# Patient Record
Sex: MALE | Race: WHITE | Employment: FULL TIME | URBAN - NONMETROPOLITAN AREA
[De-identification: names, ages, dates, MRNs, and addresses within clinical notes are randomized per-mention and may not be internally consistent; named-entity substitution may affect disease eponyms.]

---

## 2017-11-09 ENCOUNTER — HOSPITAL ENCOUNTER (OUTPATIENT)
Dept: GENERAL RADIOLOGY | Age: 47
Discharge: HOME | End: 2017-11-09
Payer: COMMERCIAL

## 2017-11-09 ENCOUNTER — HOSPITAL ENCOUNTER (OUTPATIENT)
Dept: PREADMISSION TESTING | Age: 47
Discharge: HOME | End: 2017-11-09
Payer: COMMERCIAL

## 2017-11-09 VITALS
BODY MASS INDEX: 32.98 KG/M2 | OXYGEN SATURATION: 98 % | TEMPERATURE: 97.2 F | HEIGHT: 69 IN | WEIGHT: 222.66 LBS | SYSTOLIC BLOOD PRESSURE: 156 MMHG | HEART RATE: 75 BPM | DIASTOLIC BLOOD PRESSURE: 93 MMHG

## 2017-11-09 DIAGNOSIS — Z01.818 PRE-OP TESTING: ICD-10-CM

## 2017-11-09 LAB
ALBUMIN SERPL-MCNC: 4.9 G/DL (ref 3.5–5.1)
ANION GAP SERPL CALCULATED.3IONS-SCNC: 14 MEQ/L (ref 8–16)
BASOPHILS # BLD: 0.4 %
BASOPHILS ABSOLUTE: 0 THOU/MM3 (ref 0–0.1)
BUN BLDV-MCNC: 22 MG/DL (ref 7–22)
CALCIUM SERPL-MCNC: 9.4 MG/DL (ref 8.5–10.5)
CHLORIDE BLD-SCNC: 100 MEQ/L (ref 98–111)
CO2: 27 MEQ/L (ref 23–33)
CREAT SERPL-MCNC: 1 MG/DL (ref 0.4–1.2)
EKG ATRIAL RATE: 72 BPM
EKG P AXIS: 59 DEGREES
EKG P-R INTERVAL: 142 MS
EKG Q-T INTERVAL: 378 MS
EKG QRS DURATION: 86 MS
EKG QTC CALCULATION (BAZETT): 413 MS
EKG R AXIS: 35 DEGREES
EKG T AXIS: 42 DEGREES
EKG VENTRICULAR RATE: 72 BPM
EOSINOPHIL # BLD: 0.9 %
EOSINOPHILS ABSOLUTE: 0 THOU/MM3 (ref 0–0.4)
GFR SERPL CREATININE-BSD FRML MDRD: 80 ML/MIN/1.73M2
GLUCOSE BLD-MCNC: 81 MG/DL (ref 70–108)
HCT VFR BLD CALC: 46 % (ref 42–52)
HEMOGLOBIN: 15.8 GM/DL (ref 14–18)
LYMPHOCYTES # BLD: 27.4 %
LYMPHOCYTES ABSOLUTE: 1.3 THOU/MM3 (ref 1–4.8)
MCH RBC QN AUTO: 32.1 PG (ref 27–31)
MCHC RBC AUTO-ENTMCNC: 34.4 GM/DL (ref 33–37)
MCV RBC AUTO: 93.3 FL (ref 80–94)
MONOCYTES # BLD: 10.4 %
MONOCYTES ABSOLUTE: 0.5 THOU/MM3 (ref 0.4–1.3)
NUCLEATED RED BLOOD CELLS: 0 /100 WBC
PDW BLD-RTO: 12.8 % (ref 11.5–14.5)
PLATELET # BLD: 182 THOU/MM3 (ref 130–400)
PMV BLD AUTO: 7.7 MCM (ref 7.4–10.4)
POTASSIUM SERPL-SCNC: 4.1 MEQ/L (ref 3.5–5.2)
PREALBUMIN: 39.3 MG/DL (ref 20–40)
RBC # BLD: 4.93 MILL/MM3 (ref 4.7–6.1)
SEG NEUTROPHILS: 60.9 %
SEGMENTED NEUTROPHILS ABSOLUTE COUNT: 2.9 THOU/MM3 (ref 1.8–7.7)
SODIUM BLD-SCNC: 141 MEQ/L (ref 135–145)
WBC # BLD: 4.7 THOU/MM3 (ref 4.8–10.8)

## 2017-11-09 PROCEDURE — 80048 BASIC METABOLIC PNL TOTAL CA: CPT

## 2017-11-09 PROCEDURE — 36415 COLL VENOUS BLD VENIPUNCTURE: CPT

## 2017-11-09 PROCEDURE — 71020 XR CHEST STANDARD TWO VW: CPT

## 2017-11-09 PROCEDURE — 87081 CULTURE SCREEN ONLY: CPT

## 2017-11-09 PROCEDURE — 82040 ASSAY OF SERUM ALBUMIN: CPT

## 2017-11-09 PROCEDURE — 85025 COMPLETE CBC W/AUTO DIFF WBC: CPT

## 2017-11-09 PROCEDURE — 84134 ASSAY OF PREALBUMIN: CPT

## 2017-11-09 PROCEDURE — 93005 ELECTROCARDIOGRAM TRACING: CPT

## 2017-11-09 RX ORDER — OMEPRAZOLE 20 MG/1
20 CAPSULE, DELAYED RELEASE ORAL DAILY
COMMUNITY

## 2017-11-09 RX ORDER — IBUPROFEN 400 MG/1
400 TABLET ORAL 3 TIMES DAILY
COMMUNITY

## 2017-11-09 RX ORDER — GABAPENTIN 300 MG/1
600 CAPSULE ORAL 3 TIMES DAILY
COMMUNITY

## 2017-11-09 RX ORDER — THEANINE 100 MG
CAPSULE ORAL 3 TIMES DAILY
COMMUNITY

## 2017-11-09 RX ORDER — OXYCODONE HYDROCHLORIDE AND ACETAMINOPHEN 5; 325 MG/1; MG/1
TABLET ORAL
Status: ON HOLD | COMMUNITY
End: 2017-12-10

## 2017-11-09 RX ORDER — FLUOXETINE HYDROCHLORIDE 40 MG/1
40 CAPSULE ORAL NIGHTLY
COMMUNITY

## 2017-11-09 NOTE — PROGRESS NOTES
NPO after midnight   [employer] and drivers license  Wear comfortable clean clothing  Do not bring jewelry   Shower night before surgery using Start[employer] kit provided moisten surgical area with melquiades wipes  Melquiades wipes and instruction sheet given  Bring medications in original bottles  Routine preop instructions given for pain, hand hygiene, fall prevention, infection prevention, anesthesia, cough and deep breath, and progressive diet and ambulation  Lumbar Spine booklet reviewed  Spine video viewed    needed at discharge  Bring back brace  Bring walker if you have one  JOHN [id number]  Bring CPAP

## 2017-11-09 NOTE — PROGRESS NOTES
Pain: States with sitting has pain in back and with standing pain is in both legs and feet and rates 4.  Pain scale and pain goal explained, voiced understanding

## 2017-11-10 LAB — MRSA SCREEN: NORMAL

## 2017-12-05 ENCOUNTER — ANESTHESIA EVENT (OUTPATIENT)
Dept: OPERATING ROOM | Age: 47
DRG: 454 | End: 2017-12-05
Payer: COMMERCIAL

## 2017-12-05 PROBLEM — M48.07 LUMBOSACRAL STENOSIS: Status: ACTIVE | Noted: 2017-12-05

## 2017-12-06 ENCOUNTER — APPOINTMENT (OUTPATIENT)
Dept: GENERAL RADIOLOGY | Age: 47
DRG: 454 | End: 2017-12-06
Attending: ORTHOPAEDIC SURGERY
Payer: COMMERCIAL

## 2017-12-06 ENCOUNTER — HOSPITAL ENCOUNTER (INPATIENT)
Age: 47
LOS: 4 days | Discharge: HOME | DRG: 454 | End: 2017-12-10
Attending: ORTHOPAEDIC SURGERY | Admitting: ORTHOPAEDIC SURGERY
Payer: COMMERCIAL

## 2017-12-06 ENCOUNTER — ANESTHESIA (OUTPATIENT)
Dept: OPERATING ROOM | Age: 47
DRG: 454 | End: 2017-12-06
Payer: COMMERCIAL

## 2017-12-06 VITALS
OXYGEN SATURATION: 100 % | DIASTOLIC BLOOD PRESSURE: 67 MMHG | RESPIRATION RATE: 4 BRPM | SYSTOLIC BLOOD PRESSURE: 126 MMHG

## 2017-12-06 DIAGNOSIS — M48.07 LUMBOSACRAL STENOSIS: Primary | ICD-10-CM

## 2017-12-06 PROBLEM — M48.062 SPINAL STENOSIS OF LUMBAR REGION WITH NEUROGENIC CLAUDICATION: Status: ACTIVE | Noted: 2017-12-06

## 2017-12-06 PROBLEM — Z98.1 STATUS POST LUMBAR SPINAL FUSION: Status: ACTIVE | Noted: 2017-12-06

## 2017-12-06 LAB
ABO: NORMAL
ALBUMIN SERPL-MCNC: 3.5 G/DL (ref 3.5–5.1)
ALP BLD-CCNC: 39 U/L (ref 38–126)
ALT SERPL-CCNC: 19 U/L (ref 11–66)
ANION GAP SERPL CALCULATED.3IONS-SCNC: 14 MEQ/L (ref 8–16)
ANTIBODY SCREEN: NORMAL
AST SERPL-CCNC: 29 U/L (ref 5–40)
BASE EXCESS (CALCULATED): -2.2 MMOL/L (ref -2.5–2.5)
BASOPHILS # BLD: 0 %
BASOPHILS ABSOLUTE: 0 THOU/MM3 (ref 0–0.1)
BILIRUB SERPL-MCNC: 1 MG/DL (ref 0.3–1.2)
BUN BLDV-MCNC: 16 MG/DL (ref 7–22)
CALCIUM IONIZED SERUM: 1.07 MMOL/L (ref 1.12–1.32)
CALCIUM SERPL-MCNC: 7.5 MG/DL (ref 8.5–10.5)
CHLORIDE BLD-SCNC: 106 MEQ/L (ref 98–111)
CO2: 24 MEQ/L (ref 23–33)
COLLECTED BY:: ABNORMAL
CREAT SERPL-MCNC: 0.9 MG/DL (ref 0.4–1.2)
EOSINOPHIL # BLD: 0 %
EOSINOPHILS ABSOLUTE: 0 THOU/MM3 (ref 0–0.4)
GFR SERPL CREATININE-BSD FRML MDRD: > 90 ML/MIN/1.73M2
GLUCOSE BLD-MCNC: 153 MG/DL (ref 70–108)
GLUCOSE, WHOLE BLOOD: 141 MG/DL (ref 70–108)
HCO3: 23 MMOL/L (ref 23–28)
HCT VFR BLD CALC: 31.1 % (ref 42–52)
HEMOGLOBIN FINGERSTICK, POC: 11 G/DL (ref 14–18)
HEMOGLOBIN: 10.7 GM/DL (ref 14–18)
LYMPHOCYTES # BLD: 3.5 %
LYMPHOCYTES ABSOLUTE: 0.3 THOU/MM3 (ref 1–4.8)
MAGNESIUM: 1.6 MG/DL (ref 1.6–2.4)
MCH RBC QN AUTO: 31.4 PG (ref 27–31)
MCHC RBC AUTO-ENTMCNC: 34.5 GM/DL (ref 33–37)
MCV RBC AUTO: 91 FL (ref 80–94)
MONOCYTES # BLD: 5 %
MONOCYTES ABSOLUTE: 0.4 THOU/MM3 (ref 0.4–1.3)
MRSA SCREEN RT-PCR: NEGATIVE
NUCLEATED RED BLOOD CELLS: 0 /100 WBC
O2 SATURATION: 100 %
PCO2: 39 MMHG (ref 35–45)
PDW BLD-RTO: 13.1 % (ref 11.5–14.5)
PH BLOOD GAS: 7.38 (ref 7.35–7.45)
PHOSPHORUS: 3.3 MG/DL (ref 2.4–4.7)
PLATELET # BLD: 140 THOU/MM3 (ref 130–400)
PMV BLD AUTO: 7.4 MCM (ref 7.4–10.4)
PO2: 261 MMHG (ref 71–104)
POTASSIUM SERPL-SCNC: 4.5 MEQ/L (ref 3.5–5.2)
POTASSIUM, WHOLE BLOOD: 4.5 MEQ/L (ref 3.5–4.9)
RBC # BLD: 3.42 MILL/MM3 (ref 4.7–6.1)
RH FACTOR: NORMAL
SEG NEUTROPHILS: 91.5 %
SEGMENTED NEUTROPHILS ABSOLUTE COUNT: 8 THOU/MM3 (ref 1.8–7.7)
SODIUM BLD-SCNC: 144 MEQ/L (ref 135–145)
SODIUM, WHOLE BLOOD: 144 MEQ/L (ref 138–146)
TOTAL PROTEIN: 5 G/DL (ref 6.1–8)
WBC # BLD: 8.7 THOU/MM3 (ref 4.8–10.8)

## 2017-12-06 PROCEDURE — 0SG307J FUSION OF LUMBOSACRAL JOINT WITH AUTOLOGOUS TISSUE SUBSTITUTE, POSTERIOR APPROACH, ANTERIOR COLUMN, OPEN APPROACH: ICD-10-PCS | Performed by: ORTHOPAEDIC SURGERY

## 2017-12-06 PROCEDURE — 87641 MR-STAPH DNA AMP PROBE: CPT

## 2017-12-06 PROCEDURE — 6370000000 HC RX 637 (ALT 250 FOR IP): Performed by: ORTHOPAEDIC SURGERY

## 2017-12-06 PROCEDURE — 86900 BLOOD TYPING SEROLOGIC ABO: CPT

## 2017-12-06 PROCEDURE — 85025 COMPLETE CBC W/AUTO DIFF WBC: CPT

## 2017-12-06 PROCEDURE — 7100000000 HC PACU RECOVERY - FIRST 15 MIN: Performed by: ORTHOPAEDIC SURGERY

## 2017-12-06 PROCEDURE — 83735 ASSAY OF MAGNESIUM: CPT

## 2017-12-06 PROCEDURE — 2500000003 HC RX 250 WO HCPCS: Performed by: NURSE ANESTHETIST, CERTIFIED REGISTERED

## 2017-12-06 PROCEDURE — A4450 NON-WATERPROOF TAPE: HCPCS | Performed by: ORTHOPAEDIC SURGERY

## 2017-12-06 PROCEDURE — 2580000003 HC RX 258: Performed by: NURSE ANESTHETIST, CERTIFIED REGISTERED

## 2017-12-06 PROCEDURE — 2500000003 HC RX 250 WO HCPCS: Performed by: ORTHOPAEDIC SURGERY

## 2017-12-06 PROCEDURE — 2580000003 HC RX 258: Performed by: PHYSICIAN ASSISTANT

## 2017-12-06 PROCEDURE — 84295 ASSAY OF SERUM SODIUM: CPT

## 2017-12-06 PROCEDURE — 3700000001 HC ADD 15 MINUTES (ANESTHESIA): Performed by: ORTHOPAEDIC SURGERY

## 2017-12-06 PROCEDURE — 86850 RBC ANTIBODY SCREEN: CPT

## 2017-12-06 PROCEDURE — 95940 IONM IN OPERATNG ROOM 15 MIN: CPT | Performed by: ORTHOPAEDIC SURGERY

## 2017-12-06 PROCEDURE — 2580000003 HC RX 258: Performed by: ORTHOPAEDIC SURGERY

## 2017-12-06 PROCEDURE — 82803 BLOOD GASES ANY COMBINATION: CPT

## 2017-12-06 PROCEDURE — 85018 HEMOGLOBIN: CPT

## 2017-12-06 PROCEDURE — 0SB40ZZ EXCISION OF LUMBOSACRAL DISC, OPEN APPROACH: ICD-10-PCS | Performed by: ORTHOPAEDIC SURGERY

## 2017-12-06 PROCEDURE — 3700000000 HC ANESTHESIA ATTENDED CARE: Performed by: ORTHOPAEDIC SURGERY

## 2017-12-06 PROCEDURE — 7100000001 HC PACU RECOVERY - ADDTL 15 MIN: Performed by: ORTHOPAEDIC SURGERY

## 2017-12-06 PROCEDURE — 80053 COMPREHEN METABOLIC PANEL: CPT

## 2017-12-06 PROCEDURE — 36415 COLL VENOUS BLD VENIPUNCTURE: CPT

## 2017-12-06 PROCEDURE — 3600000005 HC SURGERY LEVEL 5 BASE: Performed by: ORTHOPAEDIC SURGERY

## 2017-12-06 PROCEDURE — 86901 BLOOD TYPING SEROLOGIC RH(D): CPT

## 2017-12-06 PROCEDURE — 72020 X-RAY EXAM OF SPINE 1 VIEW: CPT

## 2017-12-06 PROCEDURE — C1713 ANCHOR/SCREW BN/BN,TIS/BN: HCPCS | Performed by: ORTHOPAEDIC SURGERY

## 2017-12-06 PROCEDURE — 0SG1071 FUSION OF 2 OR MORE LUMBAR VERTEBRAL JOINTS WITH AUTOLOGOUS TISSUE SUBSTITUTE, POSTERIOR APPROACH, POSTERIOR COLUMN, OPEN APPROACH: ICD-10-PCS | Performed by: ORTHOPAEDIC SURGERY

## 2017-12-06 PROCEDURE — 72100 X-RAY EXAM L-S SPINE 2/3 VWS: CPT

## 2017-12-06 PROCEDURE — P9017 PLASMA 1 DONOR FRZ W/IN 8 HR: HCPCS

## 2017-12-06 PROCEDURE — P9016 RBC LEUKOCYTES REDUCED: HCPCS

## 2017-12-06 PROCEDURE — P9045 ALBUMIN (HUMAN), 5%, 250 ML: HCPCS | Performed by: NURSE ANESTHETIST, CERTIFIED REGISTERED

## 2017-12-06 PROCEDURE — 6360000002 HC RX W HCPCS: Performed by: ORTHOPAEDIC SURGERY

## 2017-12-06 PROCEDURE — 0SG30AJ FUSION OF LUMBOSACRAL JOINT WITH INTERBODY FUSION DEVICE, POSTERIOR APPROACH, ANTERIOR COLUMN, OPEN APPROACH: ICD-10-PCS | Performed by: ORTHOPAEDIC SURGERY

## 2017-12-06 PROCEDURE — L8699 PROSTHETIC IMPLANT NOS: HCPCS | Performed by: ORTHOPAEDIC SURGERY

## 2017-12-06 PROCEDURE — 2000000000 HC ICU R&B

## 2017-12-06 PROCEDURE — 2720000010 HC SURG SUPPLY STERILE: Performed by: ORTHOPAEDIC SURGERY

## 2017-12-06 PROCEDURE — 82947 ASSAY GLUCOSE BLOOD QUANT: CPT

## 2017-12-06 PROCEDURE — 6360000002 HC RX W HCPCS: Performed by: PHYSICIAN ASSISTANT

## 2017-12-06 PROCEDURE — 84132 ASSAY OF SERUM POTASSIUM: CPT

## 2017-12-06 PROCEDURE — 82330 ASSAY OF CALCIUM: CPT

## 2017-12-06 PROCEDURE — 86923 COMPATIBILITY TEST ELECTRIC: CPT

## 2017-12-06 PROCEDURE — A6212 FOAM DRG <=16 SQ IN W/BORDER: HCPCS

## 2017-12-06 PROCEDURE — 3600000015 HC SURGERY LEVEL 5 ADDTL 15MIN: Performed by: ORTHOPAEDIC SURGERY

## 2017-12-06 PROCEDURE — 6360000002 HC RX W HCPCS: Performed by: ANESTHESIOLOGY

## 2017-12-06 PROCEDURE — 84100 ASSAY OF PHOSPHORUS: CPT

## 2017-12-06 PROCEDURE — 87081 CULTURE SCREEN ONLY: CPT

## 2017-12-06 PROCEDURE — 6360000002 HC RX W HCPCS: Performed by: NURSE ANESTHETIST, CERTIFIED REGISTERED

## 2017-12-06 DEVICE — SCREW 55840006550 5.5/6 MAS 6.5X50 CC
Type: IMPLANTABLE DEVICE | Site: SPINE LUMBAR | Status: FUNCTIONAL
Brand: CD HORIZON® SPINAL SYSTEM

## 2017-12-06 DEVICE — BONE GRAFT KIT 7510600 INFUSE LARGE
Type: IMPLANTABLE DEVICE | Site: SPINE LUMBAR | Status: FUNCTIONAL
Brand: INFUSE® BONE GRAFT

## 2017-12-06 DEVICE — SPACER 3992212 22MM X 12MM
Type: IMPLANTABLE DEVICE | Site: SPINE LUMBAR | Status: FUNCTIONAL
Brand: CAPSTONE PTC™ SPINAL SYSTEM

## 2017-12-06 DEVICE — CROSSLINK 811-322 LP MLTSP PL L 1.752.15
Type: IMPLANTABLE DEVICE | Site: SPINE LUMBAR | Status: FUNCTIONAL
Brand: TSRH® SPINAL SYSTEM

## 2017-12-06 RX ORDER — SODIUM CHLORIDE 9 MG/ML
INJECTION, SOLUTION INTRAVENOUS CONTINUOUS PRN
Status: DISCONTINUED | OUTPATIENT
Start: 2017-12-06 | End: 2017-12-06 | Stop reason: SDUPTHER

## 2017-12-06 RX ORDER — SODIUM CHLORIDE 0.9 % (FLUSH) 0.9 %
10 SYRINGE (ML) INJECTION PRN
Status: DISCONTINUED | OUTPATIENT
Start: 2017-12-06 | End: 2017-12-10 | Stop reason: HOSPADM

## 2017-12-06 RX ORDER — FENTANYL CITRATE 50 UG/ML
INJECTION, SOLUTION INTRAMUSCULAR; INTRAVENOUS PRN
Status: DISCONTINUED | OUTPATIENT
Start: 2017-12-06 | End: 2017-12-06 | Stop reason: SDUPTHER

## 2017-12-06 RX ORDER — ONDANSETRON 2 MG/ML
INJECTION INTRAMUSCULAR; INTRAVENOUS PRN
Status: DISCONTINUED | OUTPATIENT
Start: 2017-12-06 | End: 2017-12-06 | Stop reason: SDUPTHER

## 2017-12-06 RX ORDER — SODIUM CHLORIDE 0.9 % (FLUSH) 0.9 %
10 SYRINGE (ML) INJECTION EVERY 12 HOURS SCHEDULED
Status: DISCONTINUED | OUTPATIENT
Start: 2017-12-06 | End: 2017-12-10 | Stop reason: HOSPADM

## 2017-12-06 RX ORDER — GENTAMICIN SULFATE 80 MG/100ML
80 INJECTION, SOLUTION INTRAVENOUS ONCE
Status: COMPLETED | OUTPATIENT
Start: 2017-12-06 | End: 2017-12-06

## 2017-12-06 RX ORDER — FENTANYL CITRATE 50 UG/ML
25 INJECTION, SOLUTION INTRAMUSCULAR; INTRAVENOUS EVERY 5 MIN PRN
Status: DISCONTINUED | OUTPATIENT
Start: 2017-12-06 | End: 2017-12-06 | Stop reason: HOSPADM

## 2017-12-06 RX ORDER — ACETAMINOPHEN 650 MG/1
650 SUPPOSITORY RECTAL EVERY 4 HOURS PRN
Status: DISCONTINUED | OUTPATIENT
Start: 2017-12-06 | End: 2017-12-10 | Stop reason: HOSPADM

## 2017-12-06 RX ORDER — ONDANSETRON 2 MG/ML
4 INJECTION INTRAMUSCULAR; INTRAVENOUS
Status: DISCONTINUED | OUTPATIENT
Start: 2017-12-06 | End: 2017-12-06 | Stop reason: HOSPADM

## 2017-12-06 RX ORDER — SODIUM CHLORIDE 9 MG/ML
INJECTION, SOLUTION INTRAVENOUS CONTINUOUS
Status: DISCONTINUED | OUTPATIENT
Start: 2017-12-06 | End: 2017-12-10 | Stop reason: HOSPADM

## 2017-12-06 RX ORDER — OXYCODONE HCL 10 MG/1
10 TABLET, FILM COATED, EXTENDED RELEASE ORAL EVERY 12 HOURS SCHEDULED
Status: DISCONTINUED | OUTPATIENT
Start: 2017-12-06 | End: 2017-12-10 | Stop reason: HOSPADM

## 2017-12-06 RX ORDER — SODIUM CHLORIDE 9 MG/ML
INJECTION, SOLUTION INTRAVENOUS CONTINUOUS
Status: DISCONTINUED | OUTPATIENT
Start: 2017-12-06 | End: 2017-12-06

## 2017-12-06 RX ORDER — MEPERIDINE HYDROCHLORIDE 25 MG/ML
12.5 INJECTION INTRAMUSCULAR; INTRAVENOUS; SUBCUTANEOUS EVERY 5 MIN PRN
Status: DISCONTINUED | OUTPATIENT
Start: 2017-12-06 | End: 2017-12-06 | Stop reason: HOSPADM

## 2017-12-06 RX ORDER — DOCUSATE SODIUM 100 MG/1
100 CAPSULE, LIQUID FILLED ORAL 2 TIMES DAILY
Status: DISCONTINUED | OUTPATIENT
Start: 2017-12-06 | End: 2017-12-10 | Stop reason: HOSPADM

## 2017-12-06 RX ORDER — FENTANYL CITRATE 50 UG/ML
50 INJECTION, SOLUTION INTRAMUSCULAR; INTRAVENOUS EVERY 5 MIN PRN
Status: COMPLETED | OUTPATIENT
Start: 2017-12-06 | End: 2017-12-06

## 2017-12-06 RX ORDER — PROPOFOL 10 MG/ML
INJECTION, EMULSION INTRAVENOUS PRN
Status: DISCONTINUED | OUTPATIENT
Start: 2017-12-06 | End: 2017-12-06 | Stop reason: SDUPTHER

## 2017-12-06 RX ORDER — OXYCODONE HYDROCHLORIDE AND ACETAMINOPHEN 5; 325 MG/1; MG/1
1 TABLET ORAL EVERY 4 HOURS PRN
Status: DISCONTINUED | OUTPATIENT
Start: 2017-12-06 | End: 2017-12-10 | Stop reason: HOSPADM

## 2017-12-06 RX ORDER — SUCCINYLCHOLINE CHLORIDE 20 MG/ML
INJECTION INTRAMUSCULAR; INTRAVENOUS PRN
Status: DISCONTINUED | OUTPATIENT
Start: 2017-12-06 | End: 2017-12-06 | Stop reason: SDUPTHER

## 2017-12-06 RX ORDER — LABETALOL HYDROCHLORIDE 5 MG/ML
5 INJECTION, SOLUTION INTRAVENOUS EVERY 10 MIN PRN
Status: DISCONTINUED | OUTPATIENT
Start: 2017-12-06 | End: 2017-12-06 | Stop reason: HOSPADM

## 2017-12-06 RX ORDER — CYCLOBENZAPRINE HCL 10 MG
10 TABLET ORAL 3 TIMES DAILY PRN
Status: DISCONTINUED | OUTPATIENT
Start: 2017-12-06 | End: 2017-12-10 | Stop reason: HOSPADM

## 2017-12-06 RX ORDER — FLUOXETINE HYDROCHLORIDE 20 MG/1
40 CAPSULE ORAL DAILY
Status: DISCONTINUED | OUTPATIENT
Start: 2017-12-06 | End: 2017-12-10 | Stop reason: HOSPADM

## 2017-12-06 RX ORDER — LIDOCAINE HYDROCHLORIDE AND EPINEPHRINE 10; 10 MG/ML; UG/ML
INJECTION, SOLUTION INFILTRATION; PERINEURAL PRN
Status: DISCONTINUED | OUTPATIENT
Start: 2017-12-06 | End: 2017-12-06 | Stop reason: HOSPADM

## 2017-12-06 RX ORDER — HYDROMORPHONE HCL 110MG/55ML
PATIENT CONTROLLED ANALGESIA SYRINGE INTRAVENOUS PRN
Status: DISCONTINUED | OUTPATIENT
Start: 2017-12-06 | End: 2017-12-06 | Stop reason: SDUPTHER

## 2017-12-06 RX ORDER — PROMETHAZINE HYDROCHLORIDE 25 MG/ML
6.25 INJECTION, SOLUTION INTRAMUSCULAR; INTRAVENOUS
Status: DISCONTINUED | OUTPATIENT
Start: 2017-12-06 | End: 2017-12-06 | Stop reason: HOSPADM

## 2017-12-06 RX ORDER — HYDRALAZINE HYDROCHLORIDE 20 MG/ML
5 INJECTION INTRAMUSCULAR; INTRAVENOUS EVERY 10 MIN PRN
Status: DISCONTINUED | OUTPATIENT
Start: 2017-12-06 | End: 2017-12-06 | Stop reason: HOSPADM

## 2017-12-06 RX ORDER — ROCURONIUM BROMIDE 10 MG/ML
INJECTION, SOLUTION INTRAVENOUS PRN
Status: DISCONTINUED | OUTPATIENT
Start: 2017-12-06 | End: 2017-12-06 | Stop reason: SDUPTHER

## 2017-12-06 RX ORDER — ONDANSETRON 2 MG/ML
4 INJECTION INTRAMUSCULAR; INTRAVENOUS EVERY 6 HOURS PRN
Status: DISCONTINUED | OUTPATIENT
Start: 2017-12-06 | End: 2017-12-10 | Stop reason: HOSPADM

## 2017-12-06 RX ORDER — OMEPRAZOLE 20 MG/1
20 CAPSULE, DELAYED RELEASE ORAL DAILY
Status: DISCONTINUED | OUTPATIENT
Start: 2017-12-06 | End: 2017-12-10 | Stop reason: HOSPADM

## 2017-12-06 RX ORDER — DEXAMETHASONE SODIUM PHOSPHATE 4 MG/ML
INJECTION, SOLUTION INTRA-ARTICULAR; INTRALESIONAL; INTRAMUSCULAR; INTRAVENOUS; SOFT TISSUE PRN
Status: DISCONTINUED | OUTPATIENT
Start: 2017-12-06 | End: 2017-12-06 | Stop reason: SDUPTHER

## 2017-12-06 RX ORDER — ALBUMIN, HUMAN INJ 5% 5 %
SOLUTION INTRAVENOUS PRN
Status: DISCONTINUED | OUTPATIENT
Start: 2017-12-06 | End: 2017-12-06 | Stop reason: SDUPTHER

## 2017-12-06 RX ORDER — VANCOMYCIN HYDROCHLORIDE 1 G/200ML
1000 INJECTION, SOLUTION INTRAVENOUS ONCE
Status: COMPLETED | OUTPATIENT
Start: 2017-12-06 | End: 2017-12-06

## 2017-12-06 RX ORDER — OXYCODONE HYDROCHLORIDE AND ACETAMINOPHEN 5; 325 MG/1; MG/1
2 TABLET ORAL EVERY 4 HOURS PRN
Status: DISCONTINUED | OUTPATIENT
Start: 2017-12-06 | End: 2017-12-10 | Stop reason: HOSPADM

## 2017-12-06 RX ORDER — ACETAMINOPHEN 325 MG/1
650 TABLET ORAL EVERY 4 HOURS PRN
Status: DISCONTINUED | OUTPATIENT
Start: 2017-12-06 | End: 2017-12-10 | Stop reason: HOSPADM

## 2017-12-06 RX ORDER — THEANINE 100 MG
100 CAPSULE ORAL 3 TIMES DAILY
Status: DISCONTINUED | OUTPATIENT
Start: 2017-12-06 | End: 2017-12-06 | Stop reason: RX

## 2017-12-06 RX ORDER — MIDAZOLAM HYDROCHLORIDE 1 MG/ML
INJECTION INTRAMUSCULAR; INTRAVENOUS PRN
Status: DISCONTINUED | OUTPATIENT
Start: 2017-12-06 | End: 2017-12-06 | Stop reason: SDUPTHER

## 2017-12-06 RX ORDER — LIDOCAINE HYDROCHLORIDE 20 MG/ML
INJECTION, SOLUTION EPIDURAL; INFILTRATION; INTRACAUDAL; PERINEURAL PRN
Status: DISCONTINUED | OUTPATIENT
Start: 2017-12-06 | End: 2017-12-06 | Stop reason: SDUPTHER

## 2017-12-06 RX ADMIN — VANCOMYCIN HYDROCHLORIDE 1 G: 1 INJECTION, SOLUTION INTRAVENOUS at 09:48

## 2017-12-06 RX ADMIN — PROPOFOL 30 MG: 10 INJECTION, EMULSION INTRAVENOUS at 12:41

## 2017-12-06 RX ADMIN — SODIUM CHLORIDE: 9 INJECTION, SOLUTION INTRAVENOUS at 08:19

## 2017-12-06 RX ADMIN — OMEPRAZOLE 20 MG: 20 CAPSULE, DELAYED RELEASE ORAL at 20:26

## 2017-12-06 RX ADMIN — DEXAMETHASONE SODIUM PHOSPHATE 10 MG: 4 INJECTION, SOLUTION INTRAMUSCULAR; INTRAVENOUS at 10:09

## 2017-12-06 RX ADMIN — OXYCODONE HYDROCHLORIDE 10 MG: 10 TABLET, FILM COATED, EXTENDED RELEASE ORAL at 21:06

## 2017-12-06 RX ADMIN — SUCCINYLCHOLINE CHLORIDE 100 MG: 20 INJECTION, SOLUTION INTRAMUSCULAR; INTRAVENOUS at 09:29

## 2017-12-06 RX ADMIN — SODIUM CHLORIDE: 9 INJECTION, SOLUTION INTRAVENOUS at 09:37

## 2017-12-06 RX ADMIN — OXYCODONE HYDROCHLORIDE AND ACETAMINOPHEN 2 TABLET: 5; 325 TABLET ORAL at 23:52

## 2017-12-06 RX ADMIN — FLUOXETINE HYDROCHLORIDE 40 MG: 20 CAPSULE ORAL at 20:27

## 2017-12-06 RX ADMIN — HYDROMORPHONE HYDROCHLORIDE 1 MG: 2 INJECTION INTRAMUSCULAR; INTRAVENOUS; SUBCUTANEOUS at 10:27

## 2017-12-06 RX ADMIN — SODIUM CHLORIDE: 9 INJECTION, SOLUTION INTRAVENOUS at 10:55

## 2017-12-06 RX ADMIN — SODIUM CHLORIDE: 9 INJECTION, SOLUTION INTRAVENOUS at 15:24

## 2017-12-06 RX ADMIN — FENTANYL CITRATE 50 MCG: 50 INJECTION INTRAMUSCULAR; INTRAVENOUS at 09:58

## 2017-12-06 RX ADMIN — LIDOCAINE HYDROCHLORIDE 80 MG: 20 INJECTION, SOLUTION EPIDURAL; INFILTRATION; INTRACAUDAL; PERINEURAL at 09:29

## 2017-12-06 RX ADMIN — FENTANYL CITRATE 50 MCG: 50 INJECTION, SOLUTION INTRAMUSCULAR; INTRAVENOUS at 15:30

## 2017-12-06 RX ADMIN — Medication 30 MG: at 09:34

## 2017-12-06 RX ADMIN — FENTANYL CITRATE 100 MCG: 50 INJECTION INTRAMUSCULAR; INTRAVENOUS at 09:23

## 2017-12-06 RX ADMIN — MEPERIDINE HYDROCHLORIDE 12.5 MG: 25 INJECTION, SOLUTION INTRAMUSCULAR; INTRAVENOUS; SUBCUTANEOUS at 16:20

## 2017-12-06 RX ADMIN — HYDROMORPHONE HYDROCHLORIDE 0.25 MG: 1 INJECTION, SOLUTION INTRAMUSCULAR; INTRAVENOUS; SUBCUTANEOUS at 18:38

## 2017-12-06 RX ADMIN — DOCUSATE SODIUM 100 MG: 100 CAPSULE ORAL at 20:26

## 2017-12-06 RX ADMIN — FENTANYL CITRATE 100 MCG: 50 INJECTION INTRAMUSCULAR; INTRAVENOUS at 12:44

## 2017-12-06 RX ADMIN — ALBUMIN (HUMAN) 12.5 G: 12.5 INJECTION, SOLUTION INTRAVENOUS at 11:32

## 2017-12-06 RX ADMIN — ALBUMIN (HUMAN) 12.5 G: 12.5 INJECTION, SOLUTION INTRAVENOUS at 11:42

## 2017-12-06 RX ADMIN — Medication 20 MG: at 09:45

## 2017-12-06 RX ADMIN — HYDROMORPHONE HYDROCHLORIDE 1 MG: 2 INJECTION INTRAMUSCULAR; INTRAVENOUS; SUBCUTANEOUS at 10:03

## 2017-12-06 RX ADMIN — HYDROMORPHONE HYDROCHLORIDE 0.5 MG: 1 INJECTION, SOLUTION INTRAMUSCULAR; INTRAVENOUS; SUBCUTANEOUS at 16:05

## 2017-12-06 RX ADMIN — SODIUM CHLORIDE: 9 INJECTION, SOLUTION INTRAVENOUS at 12:10

## 2017-12-06 RX ADMIN — FENTANYL CITRATE 50 MCG: 50 INJECTION, SOLUTION INTRAMUSCULAR; INTRAVENOUS at 15:00

## 2017-12-06 RX ADMIN — HYDROMORPHONE HYDROCHLORIDE 0.5 MG: 1 INJECTION, SOLUTION INTRAMUSCULAR; INTRAVENOUS; SUBCUTANEOUS at 16:14

## 2017-12-06 RX ADMIN — CEFAZOLIN SODIUM 2 G: 2 SOLUTION INTRAVENOUS at 18:56

## 2017-12-06 RX ADMIN — PROPOFOL 200 MG: 10 INJECTION, EMULSION INTRAVENOUS at 09:29

## 2017-12-06 RX ADMIN — MIDAZOLAM HYDROCHLORIDE 2 MG: 1 INJECTION, SOLUTION INTRAMUSCULAR; INTRAVENOUS at 09:19

## 2017-12-06 RX ADMIN — SODIUM CHLORIDE: 9 INJECTION, SOLUTION INTRAVENOUS at 17:27

## 2017-12-06 RX ADMIN — Medication 20 MG: at 09:58

## 2017-12-06 RX ADMIN — FENTANYL CITRATE 50 MCG: 50 INJECTION, SOLUTION INTRAMUSCULAR; INTRAVENOUS at 15:15

## 2017-12-06 RX ADMIN — GENTAMICIN SULFATE 80 MG: 80 INJECTION, SOLUTION INTRAVENOUS at 09:38

## 2017-12-06 RX ADMIN — CYCLOBENZAPRINE 10 MG: 10 TABLET, FILM COATED ORAL at 15:45

## 2017-12-06 RX ADMIN — PHENYLEPHRINE HYDROCHLORIDE 5000 MCG: 10 INJECTION, SOLUTION INTRAMUSCULAR; INTRAVENOUS; SUBCUTANEOUS at 12:18

## 2017-12-06 RX ADMIN — ONDANSETRON 4 MG: 2 INJECTION INTRAMUSCULAR; INTRAVENOUS at 10:09

## 2017-12-06 RX ADMIN — Medication 10 ML: at 20:27

## 2017-12-06 RX ADMIN — FENTANYL CITRATE 50 MCG: 50 INJECTION, SOLUTION INTRAMUSCULAR; INTRAVENOUS at 14:45

## 2017-12-06 RX ADMIN — OXYCODONE HYDROCHLORIDE AND ACETAMINOPHEN 2 TABLET: 5; 325 TABLET ORAL at 19:25

## 2017-12-06 ASSESSMENT — PULMONARY FUNCTION TESTS
PIF_VALUE: 21
PIF_VALUE: 23
PIF_VALUE: 21
PIF_VALUE: 23
PIF_VALUE: 0
PIF_VALUE: 21
PIF_VALUE: 21
PIF_VALUE: 22
PIF_VALUE: 23
PIF_VALUE: 23
PIF_VALUE: 22
PIF_VALUE: 23
PIF_VALUE: 22
PIF_VALUE: 22
PIF_VALUE: 21
PIF_VALUE: 21
PIF_VALUE: 23
PIF_VALUE: 21
PIF_VALUE: 24
PIF_VALUE: 25
PIF_VALUE: 24
PIF_VALUE: 4
PIF_VALUE: 22
PIF_VALUE: 23
PIF_VALUE: 21
PIF_VALUE: 23
PIF_VALUE: 20
PIF_VALUE: 20
PIF_VALUE: 22
PIF_VALUE: 24
PIF_VALUE: 21
PIF_VALUE: 23
PIF_VALUE: 20
PIF_VALUE: 21
PIF_VALUE: 22
PIF_VALUE: 23
PIF_VALUE: 21
PIF_VALUE: 21
PIF_VALUE: 23
PIF_VALUE: 21
PIF_VALUE: 23
PIF_VALUE: 20
PIF_VALUE: 20
PIF_VALUE: 21
PIF_VALUE: 22
PIF_VALUE: 26
PIF_VALUE: 23
PIF_VALUE: 22
PIF_VALUE: 20
PIF_VALUE: 21
PIF_VALUE: 22
PIF_VALUE: 23
PIF_VALUE: 20
PIF_VALUE: 24
PIF_VALUE: 24
PIF_VALUE: 20
PIF_VALUE: 20
PIF_VALUE: 22
PIF_VALUE: 21
PIF_VALUE: 22
PIF_VALUE: 21
PIF_VALUE: 22
PIF_VALUE: 20
PIF_VALUE: 20
PIF_VALUE: 22
PIF_VALUE: 23
PIF_VALUE: 23
PIF_VALUE: 21
PIF_VALUE: 22
PIF_VALUE: 23
PIF_VALUE: 21
PIF_VALUE: 20
PIF_VALUE: 23
PIF_VALUE: 20
PIF_VALUE: 21
PIF_VALUE: 24
PIF_VALUE: 23
PIF_VALUE: 23
PIF_VALUE: 22
PIF_VALUE: 22
PIF_VALUE: 23
PIF_VALUE: 21
PIF_VALUE: 21
PIF_VALUE: 23
PIF_VALUE: 22
PIF_VALUE: 23
PIF_VALUE: 6
PIF_VALUE: 21
PIF_VALUE: 23
PIF_VALUE: 23
PIF_VALUE: 22
PIF_VALUE: 24
PIF_VALUE: 23
PIF_VALUE: 21
PIF_VALUE: 22
PIF_VALUE: 21
PIF_VALUE: 21
PIF_VALUE: 23
PIF_VALUE: 22
PIF_VALUE: 21
PIF_VALUE: 23
PIF_VALUE: 23
PIF_VALUE: 22
PIF_VALUE: 23
PIF_VALUE: 22
PIF_VALUE: 22
PIF_VALUE: 21
PIF_VALUE: 23
PIF_VALUE: 21
PIF_VALUE: 22
PIF_VALUE: 23
PIF_VALUE: 22
PIF_VALUE: 20
PIF_VALUE: 20
PIF_VALUE: 22
PIF_VALUE: 21
PIF_VALUE: 21
PIF_VALUE: 20
PIF_VALUE: 21
PIF_VALUE: 20
PIF_VALUE: 23
PIF_VALUE: 21
PIF_VALUE: 21
PIF_VALUE: 22
PIF_VALUE: 23
PIF_VALUE: 21
PIF_VALUE: 22
PIF_VALUE: 19
PIF_VALUE: 20
PIF_VALUE: 21
PIF_VALUE: 22
PIF_VALUE: 23
PIF_VALUE: 22
PIF_VALUE: 23
PIF_VALUE: 22
PIF_VALUE: 20
PIF_VALUE: 24
PIF_VALUE: 24
PIF_VALUE: 22
PIF_VALUE: 6
PIF_VALUE: 23
PIF_VALUE: 21
PIF_VALUE: 23
PIF_VALUE: 24
PIF_VALUE: 0
PIF_VALUE: 0
PIF_VALUE: 22
PIF_VALUE: 20
PIF_VALUE: 23
PIF_VALUE: 19
PIF_VALUE: 20
PIF_VALUE: 21
PIF_VALUE: 21
PIF_VALUE: 23
PIF_VALUE: 21
PIF_VALUE: 24
PIF_VALUE: 19
PIF_VALUE: 23
PIF_VALUE: 22
PIF_VALUE: 24
PIF_VALUE: 21
PIF_VALUE: 23
PIF_VALUE: 21
PIF_VALUE: 22
PIF_VALUE: 22
PIF_VALUE: 20
PIF_VALUE: 22
PIF_VALUE: 23
PIF_VALUE: 22
PIF_VALUE: 21
PIF_VALUE: 23
PIF_VALUE: 22
PIF_VALUE: 20
PIF_VALUE: 21
PIF_VALUE: 25
PIF_VALUE: 21
PIF_VALUE: 24
PIF_VALUE: 21
PIF_VALUE: 21
PIF_VALUE: 22
PIF_VALUE: 19
PIF_VALUE: 21
PIF_VALUE: 19
PIF_VALUE: 19
PIF_VALUE: 23
PIF_VALUE: 20
PIF_VALUE: 21
PIF_VALUE: 0
PIF_VALUE: 22
PIF_VALUE: 22
PIF_VALUE: 23
PIF_VALUE: 25
PIF_VALUE: 20
PIF_VALUE: 21
PIF_VALUE: 22
PIF_VALUE: 23
PIF_VALUE: 21
PIF_VALUE: 21
PIF_VALUE: 20
PIF_VALUE: 23
PIF_VALUE: 21
PIF_VALUE: 21
PIF_VALUE: 23
PIF_VALUE: 21
PIF_VALUE: 21
PIF_VALUE: 22
PIF_VALUE: 21
PIF_VALUE: 22
PIF_VALUE: 21
PIF_VALUE: 23
PIF_VALUE: 21
PIF_VALUE: 23
PIF_VALUE: 21
PIF_VALUE: 23
PIF_VALUE: 23
PIF_VALUE: 20
PIF_VALUE: 23
PIF_VALUE: 21
PIF_VALUE: 6
PIF_VALUE: 23
PIF_VALUE: 21
PIF_VALUE: 23
PIF_VALUE: 19
PIF_VALUE: 22
PIF_VALUE: 23
PIF_VALUE: 22
PIF_VALUE: 24
PIF_VALUE: 23
PIF_VALUE: 22
PIF_VALUE: 20
PIF_VALUE: 21
PIF_VALUE: 22
PIF_VALUE: 22
PIF_VALUE: 20
PIF_VALUE: 23
PIF_VALUE: 21
PIF_VALUE: 23
PIF_VALUE: 23
PIF_VALUE: 20
PIF_VALUE: 21
PIF_VALUE: 21
PIF_VALUE: 22
PIF_VALUE: 24
PIF_VALUE: 22
PIF_VALUE: 23
PIF_VALUE: 22
PIF_VALUE: 21
PIF_VALUE: 23
PIF_VALUE: 21
PIF_VALUE: 22
PIF_VALUE: 21
PIF_VALUE: 22
PIF_VALUE: 22
PIF_VALUE: 20
PIF_VALUE: 23
PIF_VALUE: 0
PIF_VALUE: 22
PIF_VALUE: 20
PIF_VALUE: 23
PIF_VALUE: 23
PIF_VALUE: 24
PIF_VALUE: 22
PIF_VALUE: 21
PIF_VALUE: 23
PIF_VALUE: 19
PIF_VALUE: 23
PIF_VALUE: 22
PIF_VALUE: 21
PIF_VALUE: 21
PIF_VALUE: 20
PIF_VALUE: 20
PIF_VALUE: 23
PIF_VALUE: 23
PIF_VALUE: 24
PIF_VALUE: 23
PIF_VALUE: 21
PIF_VALUE: 23
PIF_VALUE: 23
PIF_VALUE: 21
PIF_VALUE: 23
PIF_VALUE: 22
PIF_VALUE: 23
PIF_VALUE: 21
PIF_VALUE: 22
PIF_VALUE: 20
PIF_VALUE: 23
PIF_VALUE: 20

## 2017-12-06 ASSESSMENT — PAIN SCALES - GENERAL
PAINLEVEL_OUTOF10: 7
PAINLEVEL_OUTOF10: 8
PAINLEVEL_OUTOF10: 7
PAINLEVEL_OUTOF10: 8
PAINLEVEL_OUTOF10: 8
PAINLEVEL_OUTOF10: 7

## 2017-12-06 ASSESSMENT — LIFESTYLE VARIABLES: SMOKING_STATUS: 0

## 2017-12-06 ASSESSMENT — PAIN DESCRIPTION - PAIN TYPE
TYPE: SURGICAL PAIN
TYPE: SURGICAL PAIN

## 2017-12-06 ASSESSMENT — PAIN DESCRIPTION - LOCATION
LOCATION: BACK
LOCATION: BACK

## 2017-12-06 NOTE — H&P
for this operation. ALLERGIES:  PENICILLIN gives rash and hives. PAST MEDICAL HISTORY:  He has history of GI problems, murmur heart rhythm,  osteoarthritis, depression, gastroesophageal reflux disease, irritable  bowel syndrome, and sleep apnea. MEDICATIONS:  He takes Align, glucosamine chondroitin, Prilosec, Prozac,  Zanaflex, Percocet, Tylenol, and Neurontin. He has stopped taking  ibuprofen as of last week. PAST SURGICAL HISTORY:  History of left distal biceps tendon repair in  2012, lumbar back surgery in 2010, fusion of lumbar spine in 2013 with  hardware removal left side and right side done separately in 2015, all  these were done in [place]. He has also had tonsillectomy done in 1975  and vasectomy done in 2004. SOCIAL HISTORY:  He is a nonsmoker. He drinks 4-5 drinks a day for 1-2  days a week. He is currently working full time. REVIEW OF SYSTEMS:  Negative for chills, fatigue, fever, dyspnea, chest  pain, loss of bowel and bladder control, abdominal pain, nausea or  vomiting. PHYSICAL EXAMINATION:  GENERAL:  The patient is 52years of age, pleasant, cooperative, awake,  alert and oriented x3, seated in no acute distress. VITAL SIGNS:  Blood pressure 140/79, pulse 94. EXTREMITIES:  Examination of lower extremities reveals, he maintains full  strength and tone through his legs, that include his resisted knee  extension, plantar flexion, dorsiflexion and great toe extension  bilaterally. He is fully sensate. The calves are soft. Skin is intact. Straight leg raise test is negative. There is no open lesions or wounds. There is no clinical evidence of DVT. IMAGING:  CT scan showed _____ fusion of _____ evidence of pars defect at  L5. There is no evidence of pars defect at the L3 level. Previous  operation, it has been found _____ pars defect at L3 bilateral _____ L4-L5.   There is significant amount of facet arthrosis at the L5-S1 level  bilaterally and facet obstruction on the right side of L5. At the L5-S1  articulation of the facet on the left hand side shows arthrosis, but no  evidence of facet fracture. ASSESSMENT:  In conclusion, the patient has evidence of a pars defect _____  and grade I spondylolisthesis appreciated at the L4-L5 level on lateral  view. _____ radiculopathy at lumbar region, facet arthropathy lumbosacral, low  back pain, lumbosacral spinal stenosis. PLAN:  In conclusion, the patient has undergone several operations in his  low back including decompression, fusion and separate hardware removals of  the left and right hand side. He has attempted conservative treatment  since then including use of medications Percocet, Neurontin as well as  epidural steroid injections. He has been denied by insurance for a spinal  cord stimulator. His pain is continually getting worse. He is  experiencing numbness in his feet bilaterally. He also experiences leg  pain and discomfort when walking, which has become increasingly more  distressful. At this time, he is in preference of pursuing additional  operative treatment which include a revision laminectomy and posterolateral  fusion of L3-S1 with an L5-S1 PLIF instrumentation, local bone allograft  DBM with or without infuse with iliac crest bone graft. He has been  medically cleared by Dr. Driss Stallings. All the patient's questions,  concerns have been answered to his satisfaction. We have received an  informed consent. We have gone over the risks and benefits of surgery  including postoperative infection, postoperative pain, hematoma formation,  paralysis and blood loss.         Miguel A Salazar, [employer]    D: 12/05/2017 9:44:00       T: 12/05/2017 13:56:25     ALEYDA/JOSE [email or web address]  Job#: [id number]     Doc#: [id number]    CC:

## 2017-12-06 NOTE — ANESTHESIA PRE PROCEDURE
Department of Anesthesiology  Preprocedure Note       Name:  Timur Steele   Age:  52 y.o.  :  1970                                          MRN:  [id number]         Date:  2017      Surgeon: Rin Foss):  Charna Goldmann, MD    Procedure: Procedure(s):  LUMBAR LAMINECTOMY WITH PSF L3-S1 WITH PLIF L5-S1 WITH SOLERA 5.5, CAPSTONE ALVAREZ WITH DBM LOCAL BONE GRAFTING VS ICBG    Medications prior to admission:   Prior to Admission medications    Medication Sig Start Date End Date Taking? Authorizing Provider   FLUoxetine (PROZAC) 40 MG capsule Take 40 mg by mouth daily    Historical Provider, MD   ibuprofen (ADVIL;MOTRIN) 400 MG tablet Take 400 mg by mouth three times daily    Historical Provider, MD   oxyCODONE-acetaminophen (PERCOCET) 5-325 MG per tablet Take by mouth  0.5 tab every 3 hours during day then 1 tab at bedtime for total 3 tab per day .     Historical Provider, MD   Apoaequorin (PREVAGEN EXTRA STRENGTH) 20 MG CAPS Take by mouth daily    Historical Provider, MD   omeprazole (PRILOSEC) 20 MG delayed release capsule Take 20 mg by mouth daily    Historical Provider, MD   gabapentin (NEURONTIN) 300 MG capsule Take by mouth 1 tab bid and 2 tab nightly    Historical Provider, MD   L-Theanine 100 MG CAPS Take by mouth three times daily    Historical Provider, MD   Probiotic Product (PROBIOTIC DAILY PO) Take by mouth daily    Historical Provider, MD   Omega-3 Fatty Acids (FISH OIL PO) Take 1,500 mg by mouth three times a week     Historical Provider, MD   Multiple Vitamin (MULTI-VITAMIN DAILY PO) Take by mouth daily    Historical Provider, MD   Glucosamine-Chondroit-Vit C-Mn (GLUCOSAMINE CHONDR 1500 COMPLX PO) Take 1,500 mg by mouth nightly     Historical Provider, MD   Fluticasone Propionate (FLONASE NA) 50 mcg by Nasal route daily    Historical Provider, MD   CALCIUM PO Take 200 mg by mouth 3 times daily    Historical Provider, MD       Current medications:    No current facility-administered medications for this encounter. Allergies: Allergies   Allergen Reactions    Penicillins Rash       Problem List:    Patient Active Problem List   Diagnosis Code    GERD (gastroesophageal reflux disease) K21.9    Lumbosacral stenosis M48.07       Past Medical History:        Diagnosis Date    Concussion 1990    Depression     GERD (gastroesophageal reflux disease)     Heartburn     Hyperlipidemia     Osteoarthritis     Prolonged emergence from general anesthesia        Past Surgical History:        Procedure Laterality Date    COLONOSCOPY  2014    HARDWARE REMOVAL  2013    Left side    HARDWARE REMOVAL  2013    Right Side    LAMINECTOMY      PLANTAR FASCIA SURGERY  2004    Both feet    SPINAL FUSION  2012    L3-4 L4-5 Jonathon    TONSILLECTOMY AND ADENOIDECTOMY  1975    VASECTOMY  2005       Social History:    Social History   Substance Use Topics    Smoking status: Never Smoker    Smokeless tobacco: Never Used    Alcohol use Yes      Comment: 12 pack beer per day                                Counseling given: Not Answered      Vital Signs (Current): There were no vitals filed for this visit.                                            BP Readings from Last 3 Encounters:   11/09/17 (!) 156/93   12/14/15 126/84       NPO Status:                                                                                 BMI:   Wt Readings from Last 3 Encounters:   11/09/17 222 lb 10.6 oz (101 kg)   12/14/15 214 lb (97.1 kg)     There is no height or weight on file to calculate BMI.    CBC:   Lab Results   Component Value Date    WBC 4.7 11/09/2017    RBC 4.93 11/09/2017    HGB 15.8 11/09/2017    HCT 46.0 11/09/2017    MCV 93.3 11/09/2017    RDW 12.8 11/09/2017     11/09/2017       CMP:   Lab Results   Component Value Date     11/09/2017    K 4.1 11/09/2017     11/09/2017    CO2 27 11/09/2017    BUN 22 11/09/2017    CREATININE 1.0 11/09/2017    LABGLOM 80 11/09/2017    GLUCOSE 81

## 2017-12-06 NOTE — BRIEF OP NOTE
Brief Postoperative Note  ______________________________________________________________    Patient: Carmelita Wood  YOB: 1970  MRN: [id number]  Date of Procedure: 12/6/2017    Pre-Op Diagnosis: LUMBOSACRAL SPINAL STENOSIS    Post-Op Diagnosis: Same       Procedure(s):  LUMBAR LAMINECTOMY WITH PSF L3-S1 WITH PLIF L5-S1 WITH SOLERA 5.5, CAPSTONE ALVAREZ WITH DBM LOCAL BONE GRAFTING    Anesthesia: [unfilled]    Surgeon(s):  Kim Cox MD     [address] [place] [address]    Staff:  First Assistant: Keith Brito MA; Inessa Yu  Relief Scrub: Julian Poole; Rj Ayala  Scrub Person First: Batsheva Claros  Physician Assistant: Len Ramos PA-C     Estimated Blood Loss: 2400 (units unknown) mL    Complications: None    Specimens:   * No specimens in log *    Implants:    Implant Name Type Inv. Item Serial No.  Lot No. LRB No. Used   GRAFT INFUSE KT LG Bone/Graft/Tissue GRAFT INFUSE KT LG  [employer] [place] [employer] [id number] N/A 1   IMPL SPACER SPINE CAPSTONE PTC [id number] Spine IMPL SPACER SPINE CAPSTONE PTC [id number]  [employer] [employer] [id number] N/A 1   SCREW PEDICAL 5.5 SOLERA [id number] Spine SCREW PEDICAL 5.5 SOLERA [id number]  [employer] [employer]  N/A 6   SCREW PEDICAL 5.5 SOLERA [id number] Spine SCREW PEDICAL 5.5 SOLERA [id number]  [employer] [employer]  N/A 2   SCREW SOLERA BREAKOFF Spine SCREW SOLERA BREAKOFF  [place]  N/A 8   PLATE SPINE 6.42R64. 5TO54.7MM Spine PLATE SPINE 6.73W83. 5TO54.7MM  [address] 1   IMPL SPINE PEDRO LUIS PRE-CUT 5.5 SOLERA COCR 90MM Spine IMPL SPINE PEDRO LUIS PRE-CUT 5.5 SOLERA COCR 90MM   [employer] [place] [employer]   N/A 2         Drains:      Findings: Charis Doshi MD  Date: 12/6/2017  Time: 1:41 PM

## 2017-12-06 NOTE — PROGRESS NOTES
Dr Devan Weber at bedside relayed issue to him. 1605 positioned pt to right side with pillow support. Wife at bedside. 1605 gave 0.5mg of dilaudid for pain 7/10, burning and nagging pain in back  1615 repeated 0.5mg of dilaudid for pain 7/10. Pt repositioned to back and pillows for support. 1620 gave 12.5 mg of demerol for shivering also applied a warm blanket  1640 pt eyes closed, respirations unlabored. Vss.  1720 transported pt to ICU on 2 liters NC, monitored, with 1 assist. Reported off at bedside to [phone]                               Pre-Op Diagnosis: LUMBOSACRAL SPINAL STENOSIS     Post-Op Diagnosis: Same       Procedure(s):  LUMBAR LAMINECTOMY WITH PSF L3-S1 WITH PLIF L5-S1 WITH SOLERA 5.5, CAPSTONE ALVAREZ WITH DBM LOCAL BONE GRAFTING                Implants:     Implant Name Type Inv. Item Serial No.  Lot No. LRB No. Used   GRAFT INFUSE KT LG Bone/Graft/Tissue GRAFT INFUSE KT LG   [employer] [employer] [id number] N/A 1   IMPL SPACER SPINE CAPSTONE PTC [id number] Spine IMPL SPACER SPINE CAPSTONE PTC [id number]   [employer] [employer] [id number] N/A 1   SCREW PEDICAL 5.5 SOLERA 6.4PLY34II Spine SCREW PEDICAL 5.5 SOLERA 6.5TMP31RA   [employer] [employer]   N/A 6   SCREW PEDICAL 5.5 SOLERA 7.6NWX20FO Spine SCREW PEDICAL 5.5 SOLERA [id number]   [employer] [employer]   N/A 2   SCREW SOLERA BREAKOFF Spine SCREW SOLERA BREAKOFF   [place]   N/A 8   PLATE SPINE 9.22Z79. 5TO54.7MM Spine PLATE SPINE 4.91U73. 5TO54.7MM   [place]   N/A 1   IMPL SPINE PEDRO LUIS PRE-CUT 5.5 SOLERA COCR 90MM Spine IMPL SPINE PEDRO LUIS PRE-CUT 5.5 SOLERA COCR 90MM   [employer]

## 2017-12-06 NOTE — PROGRESS NOTES
ADMITTED TO [facility] AND ORIENTED TO UNIT. SCDS AND JOHN HOSE ON. FALL AND ALLERGY BANDS ON.

## 2017-12-07 LAB
ANION GAP SERPL CALCULATED.3IONS-SCNC: 12 MEQ/L (ref 8–16)
BASOPHILS # BLD: 0 %
BASOPHILS ABSOLUTE: 0 THOU/MM3 (ref 0–0.1)
BUN BLDV-MCNC: 14 MG/DL (ref 7–22)
CALCIUM SERPL-MCNC: 7.7 MG/DL (ref 8.5–10.5)
CHLORIDE BLD-SCNC: 105 MEQ/L (ref 98–111)
CO2: 24 MEQ/L (ref 23–33)
CREAT SERPL-MCNC: 0.9 MG/DL (ref 0.4–1.2)
EOSINOPHIL # BLD: 0 %
EOSINOPHILS ABSOLUTE: 0 THOU/MM3 (ref 0–0.4)
GFR SERPL CREATININE-BSD FRML MDRD: > 90 ML/MIN/1.73M2
GLUCOSE BLD-MCNC: 138 MG/DL (ref 70–108)
HCT VFR BLD CALC: 27.8 % (ref 42–52)
HEMOGLOBIN: 9.3 GM/DL (ref 14–18)
LYMPHOCYTES # BLD: 6.7 %
LYMPHOCYTES ABSOLUTE: 0.5 THOU/MM3 (ref 1–4.8)
MCH RBC QN AUTO: 30.9 PG (ref 27–31)
MCHC RBC AUTO-ENTMCNC: 33.5 GM/DL (ref 33–37)
MCV RBC AUTO: 92.1 FL (ref 80–94)
MONOCYTES # BLD: 9.5 %
MONOCYTES ABSOLUTE: 0.7 THOU/MM3 (ref 0.4–1.3)
NUCLEATED RED BLOOD CELLS: 0 /100 WBC
PDW BLD-RTO: 13 % (ref 11.5–14.5)
PLATELET # BLD: 151 THOU/MM3 (ref 130–400)
PMV BLD AUTO: 7.1 MCM (ref 7.4–10.4)
POTASSIUM SERPL-SCNC: 4.3 MEQ/L (ref 3.5–5.2)
RBC # BLD: 3.02 MILL/MM3 (ref 4.7–6.1)
SEG NEUTROPHILS: 83.8 %
SEGMENTED NEUTROPHILS ABSOLUTE COUNT: 6.3 THOU/MM3 (ref 1.8–7.7)
SODIUM BLD-SCNC: 141 MEQ/L (ref 135–145)
WBC # BLD: 7.5 THOU/MM3 (ref 4.8–10.8)

## 2017-12-07 PROCEDURE — 97530 THERAPEUTIC ACTIVITIES: CPT

## 2017-12-07 PROCEDURE — 97110 THERAPEUTIC EXERCISES: CPT

## 2017-12-07 PROCEDURE — 2580000003 HC RX 258: Performed by: ORTHOPAEDIC SURGERY

## 2017-12-07 PROCEDURE — 80048 BASIC METABOLIC PNL TOTAL CA: CPT

## 2017-12-07 PROCEDURE — G8987 SELF CARE CURRENT STATUS: HCPCS

## 2017-12-07 PROCEDURE — G8978 MOBILITY CURRENT STATUS: HCPCS

## 2017-12-07 PROCEDURE — 85025 COMPLETE CBC W/AUTO DIFF WBC: CPT

## 2017-12-07 PROCEDURE — 1200000000 HC SEMI PRIVATE

## 2017-12-07 PROCEDURE — G8979 MOBILITY GOAL STATUS: HCPCS

## 2017-12-07 PROCEDURE — 97165 OT EVAL LOW COMPLEX 30 MIN: CPT

## 2017-12-07 PROCEDURE — 6370000000 HC RX 637 (ALT 250 FOR IP): Performed by: ORTHOPAEDIC SURGERY

## 2017-12-07 PROCEDURE — 97161 PT EVAL LOW COMPLEX 20 MIN: CPT

## 2017-12-07 PROCEDURE — 36415 COLL VENOUS BLD VENIPUNCTURE: CPT

## 2017-12-07 PROCEDURE — G8988 SELF CARE GOAL STATUS: HCPCS

## 2017-12-07 PROCEDURE — 99253 IP/OBS CNSLTJ NEW/EST LOW 45: CPT | Performed by: NURSE PRACTITIONER

## 2017-12-07 PROCEDURE — 6360000002 HC RX W HCPCS: Performed by: ORTHOPAEDIC SURGERY

## 2017-12-07 RX ADMIN — OXYCODONE HYDROCHLORIDE 10 MG: 10 TABLET, FILM COATED, EXTENDED RELEASE ORAL at 21:24

## 2017-12-07 RX ADMIN — OXYCODONE HYDROCHLORIDE AND ACETAMINOPHEN 2 TABLET: 5; 325 TABLET ORAL at 12:06

## 2017-12-07 RX ADMIN — OXYCODONE HYDROCHLORIDE AND ACETAMINOPHEN 2 TABLET: 5; 325 TABLET ORAL at 15:59

## 2017-12-07 RX ADMIN — OXYCODONE HYDROCHLORIDE AND ACETAMINOPHEN 2 TABLET: 5; 325 TABLET ORAL at 04:52

## 2017-12-07 RX ADMIN — CYCLOBENZAPRINE 10 MG: 10 TABLET, FILM COATED ORAL at 21:24

## 2017-12-07 RX ADMIN — OXYCODONE HYDROCHLORIDE AND ACETAMINOPHEN 2 TABLET: 5; 325 TABLET ORAL at 20:10

## 2017-12-07 RX ADMIN — CYCLOBENZAPRINE 10 MG: 10 TABLET, FILM COATED ORAL at 01:37

## 2017-12-07 RX ADMIN — Medication 10 ML: at 09:09

## 2017-12-07 RX ADMIN — Medication 10 ML: at 20:13

## 2017-12-07 RX ADMIN — CEFAZOLIN SODIUM 2 G: 2 SOLUTION INTRAVENOUS at 01:37

## 2017-12-07 RX ADMIN — OXYCODONE HYDROCHLORIDE 10 MG: 10 TABLET, FILM COATED, EXTENDED RELEASE ORAL at 09:10

## 2017-12-07 RX ADMIN — HYDROMORPHONE HYDROCHLORIDE 0.5 MG: 1 INJECTION, SOLUTION INTRAMUSCULAR; INTRAVENOUS; SUBCUTANEOUS at 01:33

## 2017-12-07 RX ADMIN — OMEPRAZOLE 20 MG: 20 CAPSULE, DELAYED RELEASE ORAL at 09:10

## 2017-12-07 RX ADMIN — DOCUSATE SODIUM 100 MG: 100 CAPSULE ORAL at 20:10

## 2017-12-07 RX ADMIN — HYDROMORPHONE HYDROCHLORIDE 0.5 MG: 1 INJECTION, SOLUTION INTRAMUSCULAR; INTRAVENOUS; SUBCUTANEOUS at 07:54

## 2017-12-07 RX ADMIN — DOCUSATE SODIUM 100 MG: 100 CAPSULE ORAL at 09:09

## 2017-12-07 RX ADMIN — FLUOXETINE HYDROCHLORIDE 40 MG: 20 CAPSULE ORAL at 09:10

## 2017-12-07 RX ADMIN — CYCLOBENZAPRINE 10 MG: 10 TABLET, FILM COATED ORAL at 16:00

## 2017-12-07 RX ADMIN — HYDROMORPHONE HYDROCHLORIDE 0.5 MG: 1 INJECTION, SOLUTION INTRAMUSCULAR; INTRAVENOUS; SUBCUTANEOUS at 15:25

## 2017-12-07 ASSESSMENT — PAIN SCALES - GENERAL
PAINLEVEL_OUTOF10: 6
PAINLEVEL_OUTOF10: 9
PAINLEVEL_OUTOF10: 8
PAINLEVEL_OUTOF10: 8
PAINLEVEL_OUTOF10: 9
PAINLEVEL_OUTOF10: 9
PAINLEVEL_OUTOF10: 6
PAINLEVEL_OUTOF10: 8
PAINLEVEL_OUTOF10: 10
PAINLEVEL_OUTOF10: 7
PAINLEVEL_OUTOF10: 7
PAINLEVEL_OUTOF10: 9
PAINLEVEL_OUTOF10: 10
PAINLEVEL_OUTOF10: 7

## 2017-12-07 ASSESSMENT — PAIN DESCRIPTION - PAIN TYPE
TYPE: SURGICAL PAIN
TYPE: ACUTE PAIN;SURGICAL PAIN
TYPE: ACUTE PAIN;SURGICAL PAIN
TYPE: SURGICAL PAIN

## 2017-12-07 ASSESSMENT — PAIN DESCRIPTION - LOCATION
LOCATION: BACK

## 2017-12-07 ASSESSMENT — PAIN DESCRIPTION - PROGRESSION
CLINICAL_PROGRESSION: NOT CHANGED
CLINICAL_PROGRESSION: GRADUALLY IMPROVING

## 2017-12-07 ASSESSMENT — PAIN DESCRIPTION - ORIENTATION
ORIENTATION: LOWER;MID
ORIENTATION: LOWER;MID

## 2017-12-07 ASSESSMENT — PAIN DESCRIPTION - DESCRIPTORS
DESCRIPTORS: ACHING
DESCRIPTORS: ACHING

## 2017-12-07 ASSESSMENT — PAIN DESCRIPTION - FREQUENCY: FREQUENCY: CONTINUOUS

## 2017-12-07 NOTE — CARE COORDINATION
12/7/17, 9:46 AM      Trinity Cao       Admitted from: Surgery 12/6/2017/ Eladio Alonso 58 day: 1  Location: -06/006-A Reason for admit: Lumbosacral spinal stenosis [M48.07]  Status post lumbar spinal fusion [Z98.1]  Spinal stenosis of lumbar region with neurogenic claudication [M48.062] Status: IP  Admit order signed?: yes  PMH:  has a past medical history of Concussion; Depression; GERD (gastroesophageal reflux disease); Heartburn; Hyperlipidemia; Osteoarthritis; and Prolonged emergence from general anesthesia. Procedure:   12/6 LUMBAR LAMINECTOMY WITH PSF L3-S1 WITH PLIF L5-S1 WITH SOLERA 5.5, CAPSTONE ALVAREZ WITH DBM LOCAL BONE GRAFTING  Pertinent abnormal Imaging: none  Medications:  Scheduled Meds:   FLUoxetine  40 mg Oral Daily    omeprazole  20 mg Oral Daily    sodium chloride flush  10 mL Intravenous 2 times per day    docusate sodium  100 mg Oral BID    oxyCODONE  10 mg Oral 2 times per day     Continuous Infusions:   sodium chloride 100 mL/hr at 12/06/17 1727      Pertinent Info/Orders/Treatment Plan: POD #1. 2400 ml EBL - borderline BP in OR - sent to ICU post-op for monitoring. Received 2 FFP. Pulmonology and ENT consulted. Left ear w/new onset hearing loss post-op. PT/OT. Hemovac x2. On room air. Telemetry, I&O, IS, drain care, wound care, SCDs, faith care, up with assist. IVF, prn flexeril, prn IV dilaudid, oxycontin, prn percocet. IV ATB's completed. Hgb 11 - now 9.3. Order to transfer to [address] [place] [address]; awaiting bed assignment. Diet: DIET GENERAL;   DVT Prophylaxis: SCD's ordered and on  Smoking status:  reports that he has never smoked.  He has never used smokeless tobacco.   Influenza Vaccination Screening Completed: no, primary RN Susana notified  Pneumonia Vaccination Screening Completed: n/a  Core measures: VTE, SCIP  SCIP core measures:  Surgery stop time:1428  BB within 24 hours of start time AND POD 1 or 2-n/a  Atb last dose prior to 24 hours post-op (48 for CABG)-yes  Correct DVT

## 2017-12-07 NOTE — FLOWSHEET NOTE
Pt was with his wife and mother at the time of the visit. He was hoping to be well and wanted prayer and I prayed asking God to heal him. 12/07/17 1519   Encounter Summary   Services provided to: Patient and family together   Referral/Consult From: [facility] Visiting Yes  (12/7)   Complexity of Encounter Moderate   Length of Encounter 30 minutes   Routine   Type Initial   Spiritual/Episcopal   Type Spiritual support   Assessment Approachable;Calm;Peaceful   Intervention Nurtured hope;Prayer; Active listening;Empowerment;Sustaining presence/ Ministry of presence

## 2017-12-07 NOTE — PROGRESS NOTES
1720  Received from PACU via bed. Connected to monitor. Art line to left radial. Hemovac x2 from back, compressed and draining dark, bloody drainage. Back dressing dry and intact. No neuro deficits. A/O x4.

## 2017-12-07 NOTE — PROGRESS NOTES
Activity: awaiting on walker   Comment: pt reporting he feels better in standing than sitting      Functional Mobility  Functional - Mobility Device: 4-Wheeled Walker  Assist Level: Stand by assistance  Functional Mobility Comments: in room at a slow pace. Activity Tolerance:  Activity Tolerance: Patient limited by pain  Activity Tolerance: Treatment Initiated: OT alina completed this date. pt demo log rolling tech for bed mobility with SBA. pt sitting EOB donning back brace with assistance to place brace behind him and then pt able to pull velcro together. pt educated on back precautions but will need further education on. Pt dmeo functional mobility in halls using RW wiht close SBA and no LOB at a slow pace. Assessment:  Assessment: Pt admitted for back surgery with increased Loss of blood requiring a ICU stay. pt with back precautions effecting the way he completes his ADL tasks. Pt requiring education on back precautions to utilize during aDL tasks as well as increasing indep with ADL tasks. Performance deficits / Impairments: Decreased ADL status, Decreased balance  Prognosis: Good  Discharge Recommendations: Home with assist PRN    Clinical Decision Making: Clinical Decision making was of Low Complexity as the result of analysis of data from a problem focused assessment, a consideration of a limited number of treatment options, no significant comorbidities affecting the plan of care and no modification or assistance required to complete the evaluation. Patient Education:  Patient Education: OT POC, back precautions   Barriers to Learning: none     Equipment Recommendations:  Equipment Needed: No    Safety:  Safety Devices in place: Yes  Type of devices:  All fall risk precautions in place, Gait belt, Nurse notified, Call light within reach, Left in bed    Plan:  Times per week: 6x  Current Treatment Recommendations: Balance Training, Patient/Caregiver Education & Training, Self-Care / ADL    Goals:  Patient goals : go home with spouse     Short term goals  Time Frame for Short term goals: 2 weeks   Short term goal 1: Pt to complete LB ADL tasks while maintaining back precautions with less tahn min A   Short term goal 2: Pt to complete dynamic standing > 3 min reaching outside base of support with CGA and no LOB in prep for showering tasks   Short term goal 3: Pt to be educated on back precautions and dmeo follow through during all ADL tasks. Long term goals  Time Frame for Long term goals : not est d/t ELOS     Evaluation Complexity: Based on the findings of patient history, examination, clinical presentation, and decision making during this evaluation, this patient is of low complexity.     AM-PAC Inpatient Daily Activity Raw Score: 17  AM-PAC Inpatient ADL T-Scale Score : 37.26  ADL Inpatient CMS 0-100% Score: 50.11  ADL Inpatient CMS G-Code Modifier : CK

## 2017-12-07 NOTE — PROGRESS NOTES
[facility] PHYSICAL THERAPY  EVALUATION  [facility] ICU 4D    Time In: 1034  Time Out: 1112  Timed Code Treatment Minutes: 23 Minutes  Minutes: 38     Date: 2017  Patient Name: Thelma Almonte,  Gender:  male        MRN: [id number]  : 1970  (52 y.o.)      Referring Practitioner: Divya Ayala MD  Diagnosis: Lumbosacral spinal stenosis  Additional Pertinent Hx: Pt seen s/p LUMBAR LAMINECTOMY WITH PSF L3-S1 WITH PLIF L5-S1 WITH SOLERA 5.5, CAPSTONE ALVAREZ WITH DBM LOCAL BONE GRAFTING . Pt with significant blood loss during surgery, transferred to ICU post-op. Past Medical History:   Diagnosis Date    Concussion 200    Depression     GERD (gastroesophageal reflux disease)     Heartburn     Hyperlipidemia     Osteoarthritis     Prolonged emergence from general anesthesia      Past Surgical History:   Procedure Laterality Date    COLONOSCOPY      HARDWARE REMOVAL      Left side    HARDWARE REMOVAL      Right Side    LAMINECTOMY      LUMBAR FUSION N/A 2017    LUMBAR LAMINECTOMY WITH PSF L3-S1 WITH PLIF L5-S1 WITH SOLERA 5.5, CAPSTONE ALVAREZ WITH DBM LOCAL BONE GRAFTING performed by Ramses Martines MD at [address] [place] [address]      Both feet    SPINAL FUSION      L3-4 L4-5 [address]         Restrictions/Precautions:  Restrictions/Precautions: General Precautions    Position Activity Restriction  Spinal Precautions: No Bending, No Lifting, No Twisting    Required Braces or Orthoses  Spinal: Lumbar Corset  Spinal Other: on when up    Subjective:  Chart Reviewed: Yes  Patient assessed for rehabilitation services?: Yes  Family / Caregiver Present: Yes (Wife)  Subjective: RN approved session, pt states he has not been able to hear out of his L ear since surgery. Physician is aware, pt to see ENT.     General:  Overall Orientation Status: Within Functional Limits  Follows Commands: Within Functional Limits  Vision: Within Functional Limits  Hearing: Within functional limits     Pain:   . Pre Treatment Pain Screening  Pain at present: 7  Scale Used: Numeric Score  Intervention List: Patient able to continue with treatment    Social/Functional History:    Lives With: Family (Wife, son and daughter (12 and 15))  Type of Home: House  Home Layout: One level  Home Access: Stairs to enter with rails  Entrance Stairs - Number of Steps: 5 with HR on either side  Home Equipment: Rolling walker   Receives Help From: Family   Ambulation Assistance: Independent  Transfer Assistance: Independent  Active : Yes  Occupation: Full time employment    Objective:  RLE PROM: WFL     LLE PROM: WFL    B LE's 4-/5     RLE Tone: Normotonic  LLE Tone: Normotonic     Balance  Sitting - Static: Good  Sitting - Dynamic: Good  Standing - Static: Fair, +  Standing - Dynamic: Fair    Rolling to Right: Contact guard assistance  Supine to Sit: Minimal assistance (for trunk elevation, log rolling technique)  Sit to Supine: Minimal assistance (to bring LE's into bed, log rolling technique)    Transfers  Sit to Stand: Contact guard assistance (Verbal cues for hand placement as pt attempts to pull on walker)  Stand to sit: Contact guard assistance (verbal cues for hand placement)     Ambulation 1  Surface: level tile  Device: Rolling Walker  Assistance: Contact guard assistance  Quality of Gait: Pt amb with significantly decreased bora, decreased stride length, steady without LOB. Distance: 125 feet    Exercises:  Comments: Pt performs supine B LE AROM: ankle pumps, heel slides, hip abd/add and seated B LAQ AROM x 10 reps to increase strength for improved gait. Activity Tolerance:  Activity Tolerance: Patient Tolerated treatment well;Patient limited by pain    Treatment Initiated:  See above exercises.   Pt stands supported at walker x ~5 minutes after gait with CGA for balance, no c/o dizziness or goal 2: Pt to transfer sit <--> stand S for increased functional mobility. Short term goal 3: Pt to ambulate >250 feet with RW SBA for household ambulation. Short term goal 4: Pt to ascend/descend 5 steps with 1 HR SBA to enable pt to get in/out of the house. Short term goal 5: Pt to perform car transfer SBA to enable pt to get in/out of car. Long term goals  Time Frame for Long term goals : NA due to short length of stay. Evaluation Complexity: Based on the findings of patient history, examination, clinical presentation, and decision making during this evaluation, the evaluation of Lizett Vivas  is of low complexity. PT G-Codes  Functional Limitation: Mobility: Walking and moving around  Mobility: Walking and Moving Around Current Status (): At least 40 percent but less than 60 percent impaired, limited or restricted  Mobility: Walking and Moving Around Goal Status ():  At least 20 percent but less than 40 percent impaired, limited or restricted       AM-PAC Inpatient Mobility without Stair Climbing Raw Score : 15  AM-PAC Inpatient without Stair Climbing T-Scale Score : 43.03  Mobility Inpatient CMS 0-100% Score: 47.43  Mobility Inpatient without Stair CMS G-Code Modifier : CK

## 2017-12-08 DIAGNOSIS — H90.5 SENSORINEURAL HEARING LOSS (SNHL) OF LEFT EAR, UNSPECIFIED HEARING STATUS ON CONTRALATERAL SIDE: Primary | ICD-10-CM

## 2017-12-08 LAB
ANION GAP SERPL CALCULATED.3IONS-SCNC: 11 MEQ/L (ref 8–16)
BASOPHILS # BLD: 0.3 %
BASOPHILS ABSOLUTE: 0 THOU/MM3 (ref 0–0.1)
BUN BLDV-MCNC: 12 MG/DL (ref 7–22)
CALCIUM SERPL-MCNC: 8 MG/DL (ref 8.5–10.5)
CHLORIDE BLD-SCNC: 101 MEQ/L (ref 98–111)
CO2: 29 MEQ/L (ref 23–33)
CREAT SERPL-MCNC: 0.9 MG/DL (ref 0.4–1.2)
EOSINOPHIL # BLD: 0.2 %
EOSINOPHILS ABSOLUTE: 0 THOU/MM3 (ref 0–0.4)
GFR SERPL CREATININE-BSD FRML MDRD: > 90 ML/MIN/1.73M2
GLUCOSE BLD-MCNC: 110 MG/DL (ref 70–108)
HCT VFR BLD CALC: 23.6 % (ref 42–52)
HEMOGLOBIN: 8.1 GM/DL (ref 14–18)
LYMPHOCYTES # BLD: 25.3 %
LYMPHOCYTES ABSOLUTE: 1.4 THOU/MM3 (ref 1–4.8)
MCH RBC QN AUTO: 31.9 PG (ref 27–31)
MCHC RBC AUTO-ENTMCNC: 34.3 GM/DL (ref 33–37)
MCV RBC AUTO: 93 FL (ref 80–94)
MONOCYTES # BLD: 10.5 %
MONOCYTES ABSOLUTE: 0.6 THOU/MM3 (ref 0.4–1.3)
MRSA SCREEN: NORMAL
NUCLEATED RED BLOOD CELLS: 0 /100 WBC
PDW BLD-RTO: 13 % (ref 11.5–14.5)
PLATELET # BLD: 122 THOU/MM3 (ref 130–400)
PMV BLD AUTO: 7.4 MCM (ref 7.4–10.4)
POTASSIUM SERPL-SCNC: 4 MEQ/L (ref 3.5–5.2)
RBC # BLD: 2.54 MILL/MM3 (ref 4.7–6.1)
SEG NEUTROPHILS: 63.7 %
SEGMENTED NEUTROPHILS ABSOLUTE COUNT: 3.4 THOU/MM3 (ref 1.8–7.7)
SODIUM BLD-SCNC: 141 MEQ/L (ref 135–145)
VRE CULTURE: NORMAL
WBC # BLD: 5.4 THOU/MM3 (ref 4.8–10.8)

## 2017-12-08 PROCEDURE — 6360000002 HC RX W HCPCS: Performed by: OTOLARYNGOLOGY

## 2017-12-08 PROCEDURE — 80048 BASIC METABOLIC PNL TOTAL CA: CPT

## 2017-12-08 PROCEDURE — 6370000000 HC RX 637 (ALT 250 FOR IP): Performed by: ORTHOPAEDIC SURGERY

## 2017-12-08 PROCEDURE — 99232 SBSQ HOSP IP/OBS MODERATE 35: CPT | Performed by: PHYSICIAN ASSISTANT

## 2017-12-08 PROCEDURE — 2580000003 HC RX 258: Performed by: OTOLARYNGOLOGY

## 2017-12-08 PROCEDURE — 1200000000 HC SEMI PRIVATE

## 2017-12-08 PROCEDURE — 85025 COMPLETE CBC W/AUTO DIFF WBC: CPT

## 2017-12-08 PROCEDURE — 2580000003 HC RX 258: Performed by: ORTHOPAEDIC SURGERY

## 2017-12-08 PROCEDURE — 92557 COMPREHENSIVE HEARING TEST: CPT | Performed by: AUDIOLOGIST

## 2017-12-08 PROCEDURE — 97535 SELF CARE MNGMENT TRAINING: CPT

## 2017-12-08 PROCEDURE — 92567 TYMPANOMETRY: CPT | Performed by: AUDIOLOGIST

## 2017-12-08 PROCEDURE — 97116 GAIT TRAINING THERAPY: CPT

## 2017-12-08 PROCEDURE — 97110 THERAPEUTIC EXERCISES: CPT

## 2017-12-08 PROCEDURE — 36415 COLL VENOUS BLD VENIPUNCTURE: CPT

## 2017-12-08 RX ORDER — PREDNISONE 20 MG/1
40 TABLET ORAL 2 TIMES DAILY
Status: DISCONTINUED | OUTPATIENT
Start: 2017-12-08 | End: 2017-12-08

## 2017-12-08 RX ORDER — DEXAMETHASONE SODIUM PHOSPHATE 4 MG/ML
15 INJECTION, SOLUTION INTRA-ARTICULAR; INTRALESIONAL; INTRAMUSCULAR; INTRAVENOUS; SOFT TISSUE ONCE
Status: DISCONTINUED | OUTPATIENT
Start: 2017-12-08 | End: 2017-12-08

## 2017-12-08 RX ORDER — PREDNISONE 10 MG/1
TABLET ORAL
Qty: 53 TABLET | Refills: 0 | Status: SHIPPED | OUTPATIENT
Start: 2017-12-08 | End: 2017-12-10

## 2017-12-08 RX ADMIN — Medication 10 ML: at 08:20

## 2017-12-08 RX ADMIN — OXYCODONE HYDROCHLORIDE 10 MG: 10 TABLET, FILM COATED, EXTENDED RELEASE ORAL at 21:54

## 2017-12-08 RX ADMIN — DEXAMETHASONE SODIUM PHOSPHATE 15.2 MG: 4 INJECTION, SOLUTION INTRAMUSCULAR; INTRAVENOUS at 19:45

## 2017-12-08 RX ADMIN — FLUOXETINE HYDROCHLORIDE 40 MG: 20 CAPSULE ORAL at 08:19

## 2017-12-08 RX ADMIN — OXYCODONE HYDROCHLORIDE AND ACETAMINOPHEN 2 TABLET: 5; 325 TABLET ORAL at 13:07

## 2017-12-08 RX ADMIN — OXYCODONE HYDROCHLORIDE AND ACETAMINOPHEN 2 TABLET: 5; 325 TABLET ORAL at 08:20

## 2017-12-08 RX ADMIN — CYCLOBENZAPRINE 10 MG: 10 TABLET, FILM COATED ORAL at 03:56

## 2017-12-08 RX ADMIN — OXYCODONE HYDROCHLORIDE AND ACETAMINOPHEN 2 TABLET: 5; 325 TABLET ORAL at 03:56

## 2017-12-08 RX ADMIN — Medication 10 ML: at 21:54

## 2017-12-08 RX ADMIN — OXYCODONE HYDROCHLORIDE 10 MG: 10 TABLET, FILM COATED, EXTENDED RELEASE ORAL at 08:19

## 2017-12-08 RX ADMIN — MAGNESIUM HYDROXIDE 30 ML: 400 SUSPENSION ORAL at 03:56

## 2017-12-08 RX ADMIN — DOCUSATE SODIUM 100 MG: 100 CAPSULE ORAL at 21:54

## 2017-12-08 RX ADMIN — OXYCODONE HYDROCHLORIDE AND ACETAMINOPHEN 2 TABLET: 5; 325 TABLET ORAL at 17:46

## 2017-12-08 RX ADMIN — OMEPRAZOLE 20 MG: 20 CAPSULE, DELAYED RELEASE ORAL at 08:19

## 2017-12-08 RX ADMIN — CYCLOBENZAPRINE 10 MG: 10 TABLET, FILM COATED ORAL at 19:45

## 2017-12-08 RX ADMIN — DOCUSATE SODIUM 100 MG: 100 CAPSULE ORAL at 08:19

## 2017-12-08 ASSESSMENT — PAIN SCALES - GENERAL
PAINLEVEL_OUTOF10: 9
PAINLEVEL_OUTOF10: 7
PAINLEVEL_OUTOF10: 8
PAINLEVEL_OUTOF10: 8
PAINLEVEL_OUTOF10: 7
PAINLEVEL_OUTOF10: 7
PAINLEVEL_OUTOF10: 0
PAINLEVEL_OUTOF10: 7
PAINLEVEL_OUTOF10: 8

## 2017-12-08 ASSESSMENT — PAIN DESCRIPTION - LOCATION
LOCATION: BACK

## 2017-12-08 ASSESSMENT — PAIN DESCRIPTION - PAIN TYPE
TYPE: SURGICAL PAIN
TYPE: SURGICAL PAIN
TYPE: ACUTE PAIN;SURGICAL PAIN
TYPE: SURGICAL PAIN
TYPE: SURGICAL PAIN

## 2017-12-08 ASSESSMENT — PAIN DESCRIPTION - ORIENTATION
ORIENTATION: LOWER

## 2017-12-08 NOTE — PROGRESS NOTES
Physical Therapy   [facility]    Time In: 0940  Time Out: 7201  Timed Code Treatment Minutes: 26 Minutes  Minutes: 26          Date: 2017  Patient Name: Ada Douglas,  Gender:  male        MRN: [id number]  : 1970  (52 y.o.)     Referring Practitioner: Zurdo Morales MD  Diagnosis: Lumbosacral spinal stenosis  Additional Pertinent Hx: Pt seen s/p LUMBAR LAMINECTOMY WITH PSF L3-S1 WITH PLIF L5-S1 WITH SOLERA 5.5, CAPSTONE ALVAREZ WITH DBM LOCAL BONE GRAFTING . Pt with significant blood loss during surgery, transferred to ICU post-op. Past Medical History:   Diagnosis Date    Concussion 200    Depression     GERD (gastroesophageal reflux disease)     Heartburn     Hyperlipidemia     Osteoarthritis     Prolonged emergence from general anesthesia      Past Surgical History:   Procedure Laterality Date    COLONOSCOPY      HARDWARE REMOVAL      Left side    HARDWARE REMOVAL      Right Side    LAMINECTOMY      LUMBAR FUSION N/A 2017    LUMBAR LAMINECTOMY WITH PSF L3-S1 WITH PLIF L5-S1 WITH SOLERA 5.5, CAPSTONE ALVAREZ WITH DBM LOCAL BONE GRAFTING performed by Isabell Rosas MD at [address] [place] [address]      Both feet    SPINAL FUSION      L3-4 L4-5 [address]         Restrictions/Precautions:  Restrictions/Precautions: General Precautions            Position Activity Restriction  Spinal Precautions: No Bending, No Lifting, No Twisting    Required Braces or Orthoses  Spinal: Lumbar Corset  Spinal Other: on when up    Subjective:  Chart Reviewed: Yes  Family / Caregiver Present: No  Subjective: RN approved session. Pt resting in bedside chair upon arrival, pleasant and agreeable to therapy. Pt notes audiologist just left a short while ago. Pain:  Yes.   Pain Assessment  Pain Level: 7  Pain Type: Surgical pain Training    Goals:  Patient goals : To get his hearing back and to decrease pain    Short term goals  Time Frame for Short term goals: 2 weeks  Short term goal 1: Pt to transfer supine <--> sit S to enable pt to get in/out of bed. Short term goal 2: Pt to transfer sit <--> stand S for increased functional mobility. Short term goal 3: Pt to ambulate >250 feet with RW SBA for household ambulation. Short term goal 4: Pt to ascend/descend 5 steps with 1 HR SBA to enable pt to get in/out of the house. Short term goal 5: Pt to perform car transfer SBA to enable pt to get in/out of car. Long term goals  Time Frame for Long term goals : NA due to short length of stay.

## 2017-12-08 NOTE — PROGRESS NOTES
Department of Otolaryngology  Progress Note    Chief Complaint: Left sided hearing loss since surgery    SUBJECTIVE:  Patient sharon not had any improvement in his hearing over the last 24-48 hours. Audiogram done today shows profound low frequency sensorineural hearing loss, rising to moderately-severe sensorineural hearing loss for the left ear. Speech discrimination ability is excellent for the right ear. Could not test speech discrimination for the left ear. The patient reported a distorted sound quality when speech audiometry was attempted. He was unable to understand any of the words. Tympanometry revealed normal peak pressure and normal middle ear compliance, bilaterally. ROS  CONSTITUTIONAL:  negative  HEENT:  positive for  hearing loss    OBJECTIVE      Physical  VITALS:  /69   Pulse 109   Temp 98.5 °F (36.9 °C) (Oral)   Resp 18   Ht 5' 9\" (1.753 m)   Wt 225 lb 15.5 oz (102.5 kg)   SpO2 95%   BMI 33.37 kg/m²   24HR INTAKE/OUTPUT:    Intake/Output Summary (Last 24 hours) at 12/08/17 1343  Last data filed at 12/08/17 0420   Gross per 24 hour   Intake             1434 ml   Output             1385 ml   Net               49 ml     Tuning fork:     Rinne:        Right Ear:  512 hz - Result positive (air conduction greater than bone conduction)         Left Ear:  Patient cannot distinguish     Barros:         512 hz.  Result - could not lateralize    Head is normocephalic. BONG, EOM full, no diplopia, no nystagmus. Conjunctivae pink, moist, no discharge. Pinnae are WNL  R External auditory canal clear and free of any pathology  L External auditory canal clear and free of any pathology   Tympanic membranes:  R normal  L normal    No facial redness, swelling or tenderness. Salivary glands not enlarged.     Neck supple  Cervical adenopathy: no palpable lymphadenopathy    Trachea midline  Thyroid not enlarged, no palpable nodules or masses    Chest equal and symmetrical expansion, no Poor     TYMPANOGRAMS  RE    LE  [x]   [x]  WNL                         []   []  WNL w/reduced mobility  []   []         WNL w/hyper mobility  []   []         Negative pressure  []   []         Flat w/normal ECV  []   []         Flat w/large ECV  []   []         Patent PE tube  []   []         Non-Patent PE tube  []   []         Could Not Test     DISTORTION PRODUCT OTOACOUSTIC EMISSIONS SCREENING     Right Ear     [] Passed     []         Refer     [x]          Did Not Test  Left Ear        [] Passed     []        Refer     [x]          Did Not Test        COMMENTS: Audiometry and tympanometry completed at bedside. Normal hearing sensitivity for the right ear. There is mild hearing loss noted at 250Hz in the right ear; however, this may be due to testing in the patient's room, rather than in a soundproof ervin. Audiometry revealed profound low frequency sensorineural hearing loss, rising to moderately-severe sensorineural hearing loss for the left ear. Speech discrimination ability is excellent for the right ear. Could not test speech discrimination for the left ear. The patient reported a distorted sound quality when speech audiometry was attempted. He was unable to understand any of the words. Tympanometry revealed normal peak pressure and normal middle ear compliance, bilaterally. RECOMMENDATION(S):   1- Today's results were reviewed with Candis Walker CNP of the [facility] office. One of the providers from their office will follow up. 2- Repeat audiometry and tympanometry are recommended within one month to monitor for any changes, or sooner if the patient notices any changes in hearing ability.    3- If thresholds are stable with repeat audiometry, then a hearing aid evaluation could be considered; specifically a CROS hearing aid.         Current Inpatient Medications  Current Facility-Administered Medications: Dexamethasone Sodium Phosphate injection 15 mg, 15 mg, Intramuscular, Once  FLUoxetine (PROZAC) capsule 40 mg, 40 mg, Oral, Daily  omeprazole (PRILOSEC) delayed release capsule 20 mg, 20 mg, Oral, Daily  0.9 % sodium chloride infusion, , Intravenous, Continuous  sodium chloride flush 0.9 % injection 10 mL, 10 mL, Intravenous, 2 times per day  sodium chloride flush 0.9 % injection 10 mL, 10 mL, Intravenous, PRN  acetaminophen (TYLENOL) tablet 650 mg, 650 mg, Oral, Q4H PRN  acetaminophen (TYLENOL) suppository 650 mg, 650 mg, Rectal, Q4H PRN  cyclobenzaprine (FLEXERIL) tablet 10 mg, 10 mg, Oral, TID PRN  docusate sodium (COLACE) capsule 100 mg, 100 mg, Oral, BID  magnesium hydroxide (MILK OF MAGNESIA) 400 MG/5ML suspension 30 mL, 30 mL, Oral, Daily PRN  ondansetron (ZOFRAN) injection 4 mg, 4 mg, Intravenous, Q6H PRN  oxyCODONE-acetaminophen (PERCOCET) 5-325 MG per tablet 1 tablet, 1 tablet, Oral, Q4H PRN **OR** oxyCODONE-acetaminophen (PERCOCET) 5-325 MG per tablet 2 tablet, 2 tablet, Oral, Q4H PRN  HYDROmorphone (DILAUDID) injection 0.25 mg, 0.25 mg, Intravenous, Q3H PRN **OR** HYDROmorphone (DILAUDID) injection 0.5 mg, 0.5 mg, Intravenous, Q3H PRN  oxyCODONE (OXYCONTIN) extended release tablet 10 mg, 10 mg, Oral, 2 times per day    ASSESSMENT AND PLAN    Sudden hearing loss, left - unknown etiology  -  Normal ear exam.  R/o sudden left SNHL.    - No change in patient's hearing ability  -  Audiogram performed and confirms profound left SNHL  - Will start with high dose steroids IV (15mg Decadron)  - Will start 10 day steroid taper on discharge  - Steroids were discussed with Dr. Alejo Moses and he is okay proceeding with this  - Will order repeat audiogram in 1 month and follow up with Dr. Kieran Muñiz     S/p multi-level lumbar surgery with decompression        Electronically signed by KEITH Fernandez on 12/8/2017 at 1:21 PM

## 2017-12-09 LAB
ANION GAP SERPL CALCULATED.3IONS-SCNC: 10 MEQ/L (ref 8–16)
BASOPHILS # BLD: 0 %
BASOPHILS ABSOLUTE: 0 THOU/MM3 (ref 0–0.1)
BUN BLDV-MCNC: 9 MG/DL (ref 7–22)
CALCIUM SERPL-MCNC: 8.5 MG/DL (ref 8.5–10.5)
CHLORIDE BLD-SCNC: 102 MEQ/L (ref 98–111)
CO2: 29 MEQ/L (ref 23–33)
CREAT SERPL-MCNC: 0.8 MG/DL (ref 0.4–1.2)
EOSINOPHIL # BLD: 0 %
EOSINOPHILS ABSOLUTE: 0 THOU/MM3 (ref 0–0.4)
GFR SERPL CREATININE-BSD FRML MDRD: > 90 ML/MIN/1.73M2
GLUCOSE BLD-MCNC: 146 MG/DL (ref 70–108)
HCT VFR BLD CALC: 22.6 % (ref 42–52)
HEMOGLOBIN: 7.7 GM/DL (ref 14–18)
LYMPHOCYTES # BLD: 6.5 %
LYMPHOCYTES ABSOLUTE: 0.3 THOU/MM3 (ref 1–4.8)
MCH RBC QN AUTO: 31.1 PG (ref 27–31)
MCHC RBC AUTO-ENTMCNC: 33.9 GM/DL (ref 33–37)
MCV RBC AUTO: 91.6 FL (ref 80–94)
MONOCYTES # BLD: 3.4 %
MONOCYTES ABSOLUTE: 0.2 THOU/MM3 (ref 0.4–1.3)
NUCLEATED RED BLOOD CELLS: 0 /100 WBC
PDW BLD-RTO: 13.2 % (ref 11.5–14.5)
PLATELET # BLD: 150 THOU/MM3 (ref 130–400)
PMV BLD AUTO: 7 MCM (ref 7.4–10.4)
POTASSIUM SERPL-SCNC: 4.7 MEQ/L (ref 3.5–5.2)
RBC # BLD: 2.47 MILL/MM3 (ref 4.7–6.1)
SEG NEUTROPHILS: 90.1 %
SEGMENTED NEUTROPHILS ABSOLUTE COUNT: 4.6 THOU/MM3 (ref 1.8–7.7)
SODIUM BLD-SCNC: 141 MEQ/L (ref 135–145)
WBC # BLD: 5.1 THOU/MM3 (ref 4.8–10.8)

## 2017-12-09 PROCEDURE — 99232 SBSQ HOSP IP/OBS MODERATE 35: CPT | Performed by: PHYSICIAN ASSISTANT

## 2017-12-09 PROCEDURE — 97116 GAIT TRAINING THERAPY: CPT

## 2017-12-09 PROCEDURE — 1200000000 HC SEMI PRIVATE

## 2017-12-09 PROCEDURE — 6360000002 HC RX W HCPCS: Performed by: PHYSICIAN ASSISTANT

## 2017-12-09 PROCEDURE — 85025 COMPLETE CBC W/AUTO DIFF WBC: CPT

## 2017-12-09 PROCEDURE — 6370000000 HC RX 637 (ALT 250 FOR IP): Performed by: ORTHOPAEDIC SURGERY

## 2017-12-09 PROCEDURE — 80048 BASIC METABOLIC PNL TOTAL CA: CPT

## 2017-12-09 PROCEDURE — P9016 RBC LEUKOCYTES REDUCED: HCPCS

## 2017-12-09 PROCEDURE — 6370000000 HC RX 637 (ALT 250 FOR IP): Performed by: PHYSICIAN ASSISTANT

## 2017-12-09 PROCEDURE — 36430 TRANSFUSION BLD/BLD COMPNT: CPT

## 2017-12-09 PROCEDURE — 36415 COLL VENOUS BLD VENIPUNCTURE: CPT

## 2017-12-09 PROCEDURE — 2580000003 HC RX 258: Performed by: ORTHOPAEDIC SURGERY

## 2017-12-09 PROCEDURE — 97110 THERAPEUTIC EXERCISES: CPT

## 2017-12-09 PROCEDURE — 2580000003 HC RX 258: Performed by: PHYSICIAN ASSISTANT

## 2017-12-09 RX ORDER — DEXAMETHASONE SODIUM PHOSPHATE 4 MG/ML
15 INJECTION, SOLUTION INTRA-ARTICULAR; INTRALESIONAL; INTRAMUSCULAR; INTRAVENOUS; SOFT TISSUE ONCE
Status: DISCONTINUED | OUTPATIENT
Start: 2017-12-09 | End: 2017-12-09

## 2017-12-09 RX ORDER — 0.9 % SODIUM CHLORIDE 0.9 %
250 INTRAVENOUS SOLUTION INTRAVENOUS ONCE
Status: CANCELLED | OUTPATIENT
Start: 2017-12-09 | End: 2017-12-09

## 2017-12-09 RX ADMIN — OXYCODONE HYDROCHLORIDE AND ACETAMINOPHEN 2 TABLET: 5; 325 TABLET ORAL at 14:02

## 2017-12-09 RX ADMIN — DOCUSATE SODIUM 100 MG: 100 CAPSULE ORAL at 09:49

## 2017-12-09 RX ADMIN — OXYCODONE HYDROCHLORIDE AND ACETAMINOPHEN 2 TABLET: 5; 325 TABLET ORAL at 04:31

## 2017-12-09 RX ADMIN — OMEPRAZOLE 20 MG: 20 CAPSULE, DELAYED RELEASE ORAL at 09:50

## 2017-12-09 RX ADMIN — OXYCODONE HYDROCHLORIDE AND ACETAMINOPHEN 2 TABLET: 5; 325 TABLET ORAL at 09:49

## 2017-12-09 RX ADMIN — DOCUSATE SODIUM 100 MG: 100 CAPSULE ORAL at 21:20

## 2017-12-09 RX ADMIN — Medication 10 ML: at 21:20

## 2017-12-09 RX ADMIN — OXYCODONE HYDROCHLORIDE AND ACETAMINOPHEN 2 TABLET: 5; 325 TABLET ORAL at 22:43

## 2017-12-09 RX ADMIN — OXYCODONE HYDROCHLORIDE 10 MG: 10 TABLET, FILM COATED, EXTENDED RELEASE ORAL at 21:20

## 2017-12-09 RX ADMIN — MAGESIUM CITRATE 296 ML: 1.75 LIQUID ORAL at 17:52

## 2017-12-09 RX ADMIN — OXYCODONE HYDROCHLORIDE AND ACETAMINOPHEN 2 TABLET: 5; 325 TABLET ORAL at 18:05

## 2017-12-09 RX ADMIN — OXYCODONE HYDROCHLORIDE AND ACETAMINOPHEN 2 TABLET: 5; 325 TABLET ORAL at 00:00

## 2017-12-09 RX ADMIN — OXYCODONE HYDROCHLORIDE 10 MG: 10 TABLET, FILM COATED, EXTENDED RELEASE ORAL at 09:49

## 2017-12-09 RX ADMIN — Medication 10 ML: at 16:47

## 2017-12-09 RX ADMIN — FLUOXETINE HYDROCHLORIDE 40 MG: 20 CAPSULE ORAL at 09:49

## 2017-12-09 RX ADMIN — CYCLOBENZAPRINE 10 MG: 10 TABLET, FILM COATED ORAL at 22:43

## 2017-12-09 RX ADMIN — SODIUM CHLORIDE 15.2 MG: 9 INJECTION INTRAMUSCULAR; INTRAVENOUS; SUBCUTANEOUS at 17:51

## 2017-12-09 ASSESSMENT — PAIN DESCRIPTION - ORIENTATION
ORIENTATION: LOWER

## 2017-12-09 ASSESSMENT — PAIN SCALES - GENERAL
PAINLEVEL_OUTOF10: 7
PAINLEVEL_OUTOF10: 8
PAINLEVEL_OUTOF10: 3
PAINLEVEL_OUTOF10: 7
PAINLEVEL_OUTOF10: 5
PAINLEVEL_OUTOF10: 4
PAINLEVEL_OUTOF10: 7

## 2017-12-09 ASSESSMENT — PAIN DESCRIPTION - LOCATION
LOCATION: BACK

## 2017-12-09 ASSESSMENT — PAIN DESCRIPTION - PAIN TYPE
TYPE: SURGICAL PAIN

## 2017-12-09 NOTE — PROGRESS NOTES
Department of Orthopedic Surgery  Spine Service  Attending Progress Note        Subjective:  complaint of left hip pain only with standing and walking but had a good night overall; would like to go home tomorrow. Vitals  VITALS:  /83   Pulse 87   Temp 97.8 °F (36.6 °C) (Oral)   Resp 18   Ht 5' 9\" (1.753 m)   Wt 225 lb 15.5 oz (102.5 kg)   SpO2 96%   BMI 33.37 kg/m²   24HR INTAKE/OUTPUT:    Intake/Output Summary (Last 24 hours) at 12/09/17 0843  Last data filed at 12/09/17 0434   Gross per 24 hour   Intake             1310 ml   Output              225 ml   Net             1085 ml     URINARY CATHETER OUTPUT (Fry):  [REMOVED] Urethral Catheter Latex 16 fr-Output (mL): 200 mL  DRAIN/TUBE OUTPUT:  Closed/Suction Drain Posterior Back-Output (ml): 20 ml  Closed/Suction Drain Posterior;Right Back Accordion-Output (ml): 0 ml      PHYSICAL EXAM:    Orientation:  alert and oriented to person, place and time    Incision:  dressing in place, clean, dry, intact    Lower Extremity Motor :  quadriceps, extensor hallucis longus, dorsiflexion, plantarflexion 5/5 bilaterally  Lower Extremity Sensory:  Intact L1-S1    Flatus:  positive    ABNORMAL EXAM FINDINGS:  none    LABS:    HgB:    Lab Results   Component Value Date    HGB 7.7 12/09/2017       ASSESSMENT AND PLAN:    Post operative day 3    1:  Monitor labs and drain output  2:  Activity Level:  OOB as tolerated  3:  Pain Control:  No change  4:  Discharge Planning:  Continue ambulating today; must have bowel movement today; Give 2 unit of blood today; will go home tomorrow when drain numbers are low.

## 2017-12-09 NOTE — PROGRESS NOTES
Department of Otolaryngology  Progress Note    Chief Complaint: Left hearing loss    SUBJECTIVE:  Patient received 15mg Decadrom yesterday for profound left sided SNHL. He reports mild improvement in hearing today. He states he has a 'ringing' that has developed. There are certain noise that have made him cringe. (Curtain opening and toilet flushing). He appears to be improving. He denies otalgia at this time. ROS    CONSTITUTIONAL:  negative  HEENT:  positive for  hearing loss    OBJECTIVE      Physical  VITALS:  BP (!) 108/59   Pulse 107   Temp 98.7 °F (37.1 °C) (Oral)   Resp 18   Ht 5' 9\" (1.753 m)   Wt 225 lb 15.5 oz (102.5 kg)   SpO2 99% Comment: room air;lungs clear  BMI 33.37 kg/m²   24HR INTAKE/OUTPUT:    Intake/Output Summary (Last 24 hours) at 12/09/17 1358  Last data filed at 12/09/17 0434   Gross per 24 hour   Intake             1310 ml   Output              225 ml   Net             1085 ml     CONSTITUTIONAL:  awake, alert, cooperative, no apparent distress, and appears stated age  ENT:  Normocephalic, without obvious abnormality, atraumatic, sinuses nontender on palpation, external ears without lesions, oral pharynx with moist mucus membranes, tonsils without erythema or exudates, gums normal and good dentition.   NECK:  Supple, symmetrical, trachea midline, no adenopathy, thyroid symmetric, not enlarged and no tenderness, skin normal  LUNGS:  No increased work of breathing, good air exchange  CARDIOVASCULAR:  Normal apical impulse, regular rate and rhythm,    Data  CBC:   Lab Results   Component Value Date    WBC 5.1 12/09/2017    RBC 2.47 12/09/2017    HGB 7.7 12/09/2017    HCT 22.6 12/09/2017    MCV 91.6 12/09/2017    MCH 31.1 12/09/2017    MCHC 33.9 12/09/2017    RDW 13.2 12/09/2017     12/09/2017    MPV 7.0 12/09/2017     CBC with Differential:    Lab Results   Component Value Date    WBC 5.1 12/09/2017    RBC 2.47 12/09/2017    HGB 7.7 12/09/2017    HCT 22.6 12/09/2017     Oral, TID PRN  docusate sodium (COLACE) capsule 100 mg, 100 mg, Oral, BID  magnesium hydroxide (MILK OF MAGNESIA) 400 MG/5ML suspension 30 mL, 30 mL, Oral, Daily PRN  ondansetron (ZOFRAN) injection 4 mg, 4 mg, Intravenous, Q6H PRN  oxyCODONE-acetaminophen (PERCOCET) 5-325 MG per tablet 1 tablet, 1 tablet, Oral, Q4H PRN **OR** oxyCODONE-acetaminophen (PERCOCET) 5-325 MG per tablet 2 tablet, 2 tablet, Oral, Q4H PRN  HYDROmorphone (DILAUDID) injection 0.25 mg, 0.25 mg, Intravenous, Q3H PRN **OR** HYDROmorphone (DILAUDID) injection 0.5 mg, 0.5 mg, Intravenous, Q3H PRN  oxyCODONE (OXYCONTIN) extended release tablet 10 mg, 10 mg, Oral, 2 times per day    ASSESSMENT AND PLAN    Sudden hearing loss, left - unknown etiology  -  Normal ear exam.    - Profound SNHL confirmed by audiogram  - Patient's hearing has slightly improved. - Has developed a tinnitus in the left ear  -  Will repeat high dose steroids IV (15mg Decadron)  - Will start 10 day steroid taper on discharge  - Steroids were discussed with Dr. Ramesh Garcia and he is okay proceeding with this  - Will order repeat audiogram in 1 month and follow up with Dr. Brittni Stapleton  - Have patient call and schedule follow up and audiogram after discharge    [facility]    [address] [address]  [address]    [phone]     S/p multi-level lumbar surgery with decompression        Electronically signed by KEITH Lynch on 12/9/2017 at 1:56 PM

## 2017-12-09 NOTE — PROGRESS NOTES
Physical Therapy   [facility]    Time In: 8387  Time Out: 1019  Timed Code Treatment Minutes: 23 Minutes  Minutes: 23          Date: 2017  Patient Name: Luba Miller,  Gender:  male        MRN: [id number]  : 1970  (52 y.o.)     Referring Practitioner: Perry Javier MD  Diagnosis: Lumbosacral spinal stenosis  Additional Pertinent Hx: Pt seen s/p LUMBAR LAMINECTOMY WITH PSF L3-S1 WITH PLIF L5-S1 WITH SOLERA 5.5, CAPSTONE ALVAREZ WITH DBM LOCAL BONE GRAFTING . Pt with significant blood loss during surgery, transferred to ICU post-op. Past Medical History:   Diagnosis Date    Concussion 200    Depression     GERD (gastroesophageal reflux disease)     Heartburn     Hyperlipidemia     Osteoarthritis     Prolonged emergence from general anesthesia      Past Surgical History:   Procedure Laterality Date    COLONOSCOPY      HARDWARE REMOVAL      Left side    HARDWARE REMOVAL      Right Side    LAMINECTOMY      LUMBAR FUSION N/A 2017    LUMBAR LAMINECTOMY WITH PSF L3-S1 WITH PLIF L5-S1 WITH SOLERA 5.5, CAPSTONE ALVAREZ WITH DBM LOCAL BONE GRAFTING performed by Veena Babin MD at [address] [place] [address]      Both feet    SPINAL FUSION      L3-4 L4-5 [address]         Restrictions/Precautions:  Restrictions/Precautions: General Precautions            Position Activity Restriction  Spinal Precautions: No Bending, No Lifting, No Twisting    Required Braces or Orthoses  Spinal: Lumbar Corset  Spinal Other: on when up    Subjective:     Subjective: RN approved session, pt up in bedside chair at arrival. Pt agreeable to amb and perform therex, pt also agreeable to perform steps. Pt states he still can't hear out of L ear.      Pain:   .  Pain Assessment  Pain Level: 7       Social/Functional:  Lives With: Family (spouse, gait training, stair training, car transfer  Current Treatment Recommendations: Strengthening, Functional Mobility Training, Home Exercise Program, Transfer Training, Stair training, Gait Training, Safety Education & Training, Patient/Caregiver Education & Training    Goals:  Patient goals : To get his hearing back and to decrease pain    Short term goals  Time Frame for Short term goals: 2 weeks  Short term goal 1: Pt to transfer supine <--> sit S to enable pt to get in/out of bed. Short term goal 2: Pt to transfer sit <--> stand S for increased functional mobility. Short term goal 3: Pt to ambulate >250 feet with RW SBA for household ambulation. Short term goal 4: Pt to ascend/descend 5 steps with 1 HR SBA to enable pt to get in/out of the house. Short term goal 5: Pt to perform car transfer SBA to enable pt to get in/out of car. Long term goals  Time Frame for Long term goals : NA due to short length of stay.          AM-PAC Inpatient Mobility Raw Score : 18  AM-PAC Inpatient T-Scale Score : 43.63  Mobility Inpatient CMS 0-100% Score: 46.58  Mobility Inpatient CMS G-Code Modifier : CK

## 2017-12-10 VITALS
HEIGHT: 69 IN | DIASTOLIC BLOOD PRESSURE: 70 MMHG | BODY MASS INDEX: 33.47 KG/M2 | HEART RATE: 92 BPM | WEIGHT: 225.97 LBS | TEMPERATURE: 97.9 F | SYSTOLIC BLOOD PRESSURE: 114 MMHG | OXYGEN SATURATION: 96 % | RESPIRATION RATE: 16 BRPM

## 2017-12-10 LAB
HCT VFR BLD CALC: 29.8 % (ref 42–52)
HEMOGLOBIN: 10 GM/DL (ref 14–18)

## 2017-12-10 PROCEDURE — 6370000000 HC RX 637 (ALT 250 FOR IP): Performed by: ORTHOPAEDIC SURGERY

## 2017-12-10 PROCEDURE — 85014 HEMATOCRIT: CPT

## 2017-12-10 PROCEDURE — 85018 HEMOGLOBIN: CPT

## 2017-12-10 PROCEDURE — 97116 GAIT TRAINING THERAPY: CPT

## 2017-12-10 PROCEDURE — 36415 COLL VENOUS BLD VENIPUNCTURE: CPT

## 2017-12-10 PROCEDURE — 2580000003 HC RX 258: Performed by: ORTHOPAEDIC SURGERY

## 2017-12-10 PROCEDURE — 97530 THERAPEUTIC ACTIVITIES: CPT

## 2017-12-10 RX ORDER — CYCLOBENZAPRINE HCL 10 MG
10 TABLET ORAL 3 TIMES DAILY PRN
Qty: 50 TABLET | Refills: 0 | Status: SHIPPED | OUTPATIENT
Start: 2017-12-10 | End: 2017-12-31

## 2017-12-10 RX ORDER — PREDNISONE 10 MG/1
TABLET ORAL
Qty: 53 TABLET | Refills: 0 | Status: SHIPPED | OUTPATIENT
Start: 2017-12-10 | End: 2018-01-04 | Stop reason: ALTCHOICE

## 2017-12-10 RX ORDER — OXYCODONE HYDROCHLORIDE 10 MG/1
10 TABLET ORAL EVERY 6 HOURS PRN
Qty: 50 TABLET | Refills: 0 | Status: SHIPPED | OUTPATIENT
Start: 2017-12-10 | End: 2017-12-31

## 2017-12-10 RX ORDER — OXYCODONE HYDROCHLORIDE AND ACETAMINOPHEN 5; 325 MG/1; MG/1
1 TABLET ORAL EVERY 6 HOURS PRN
Qty: 50 TABLET | Refills: 0 | Status: SHIPPED | OUTPATIENT
Start: 2017-12-10 | End: 2017-12-31

## 2017-12-10 RX ADMIN — OMEPRAZOLE 20 MG: 20 CAPSULE, DELAYED RELEASE ORAL at 09:18

## 2017-12-10 RX ADMIN — OXYCODONE HYDROCHLORIDE AND ACETAMINOPHEN 2 TABLET: 5; 325 TABLET ORAL at 06:58

## 2017-12-10 RX ADMIN — Medication 10 ML: at 09:18

## 2017-12-10 RX ADMIN — DOCUSATE SODIUM 100 MG: 100 CAPSULE ORAL at 09:18

## 2017-12-10 RX ADMIN — OXYCODONE HYDROCHLORIDE 10 MG: 10 TABLET, FILM COATED, EXTENDED RELEASE ORAL at 09:18

## 2017-12-10 RX ADMIN — OXYCODONE HYDROCHLORIDE AND ACETAMINOPHEN 2 TABLET: 5; 325 TABLET ORAL at 12:57

## 2017-12-10 RX ADMIN — FLUOXETINE HYDROCHLORIDE 40 MG: 20 CAPSULE ORAL at 09:18

## 2017-12-10 ASSESSMENT — PAIN SCALES - GENERAL
PAINLEVEL_OUTOF10: 7
PAINLEVEL_OUTOF10: 3
PAINLEVEL_OUTOF10: 4
PAINLEVEL_OUTOF10: 3
PAINLEVEL_OUTOF10: 7
PAINLEVEL_OUTOF10: 4
PAINLEVEL_OUTOF10: 4

## 2017-12-10 ASSESSMENT — PAIN DESCRIPTION - LOCATION
LOCATION: BACK
LOCATION: BACK
LOCATION: ARM
LOCATION: BACK

## 2017-12-10 ASSESSMENT — PAIN DESCRIPTION - PAIN TYPE
TYPE: SURGICAL PAIN

## 2017-12-10 ASSESSMENT — PAIN DESCRIPTION - DESCRIPTORS
DESCRIPTORS: ACHING

## 2017-12-10 NOTE — PROGRESS NOTES
[address] [address]  INPATIENT PHYSICAL THERAPY  DAILY NOTE  [facility] 7[facility]    Time In:   Time Out: 8277  Timed Code Treatment Minutes: 28 Minutes  Minutes: 28          Date: 12/10/2017  Patient Name: Zaheer Pham,  Gender:  male        MRN: [id number]  : 1970  (52 y.o.)     Referring Practitioner: Johnnie Dobbins MD  Diagnosis: Lumbosacral spinal stenosis  Additional Pertinent Hx: Pt seen s/p LUMBAR LAMINECTOMY WITH PSF L3-S1 WITH PLIF L5-S1 WITH SOLERA 5.5, CAPSTONE ALVAREZ WITH DBM LOCAL BONE GRAFTING . Pt with significant blood loss during surgery, transferred to ICU post-op. Past Medical History:   Diagnosis Date    Concussion 200    Depression     GERD (gastroesophageal reflux disease)     Heartburn     Hyperlipidemia     Osteoarthritis     Prolonged emergence from general anesthesia      Past Surgical History:   Procedure Laterality Date    COLONOSCOPY      HARDWARE REMOVAL      Left side    HARDWARE REMOVAL      Right Side    LAMINECTOMY      LUMBAR FUSION N/A 2017    LUMBAR LAMINECTOMY WITH PSF L3-S1 WITH PLIF L5-S1 WITH SOLERA 5.5, CAPSTONE ALVAREZ WITH DBM LOCAL BONE GRAFTING performed by Zachery Simon MD at [address] [place] [address]      Both feet    SPINAL FUSION      L3-4 L4-5 [address]         Restrictions/Precautions:  Restrictions/Precautions: General Precautions            Position Activity Restriction  Spinal Precautions: No Bending, No Lifting, No Twisting    Required Braces or Orthoses  Spinal: Lumbar Corset  Spinal Other: on when up    Subjective:     Subjective: RN approved session. Patient up walking in hallway with wife upon arrival. Patient agreeable to therapy. Wife notes pt cannot hear out of L ear. Pain:  Yes.   Pain Assessment  Pain Assessment: 0-10  Pain Level: 4 (notes radiating pain in L hip)       Social/Functional:  Lives With: Family

## 2017-12-10 NOTE — PLAN OF CARE
Problem: PAIN  Goal: Patient's pain/discomfort is manageable  Outcome: Ongoing  Patient pain goal 2-3 patient rating pain between 4-7 patient states pain is getting better. PRN pain medications given when available. White board updated. Problem: SKIN INTEGRITY  Goal: Skin integrity is maintained or improved  Outcome: Ongoing  Dressing to lower back clean dry and intact. Patient able to reposition self in bed. No new skin breakdown this shift. Problem: DISCHARGE BARRIERS  Goal: Patient's continuum of care needs are met  Outcome: Ongoing  Patient plans on home at discharge. Discharge planning in progress. Problem: Falls - Risk of  Goal: Absence of falls  Outcome: Ongoing  Patient remains free from falls this shift. Call light with in reach. Hourly rounding performed. Problem: Neurological  Goal: Maximum potential motor/sensory/cognitive function  Outcome: Ongoing  Patient alert and oriented. Denies n/t. Patient state he is still unable to hear out of left ear. Problem: Cardiovascular  Goal: No DVT, peripheral vascular complications  Outcome: Ongoing  No s/s of DVT this shift. JOHN hose on. Problem: Respiratory  Goal: No pulmonary complications  Outcome: Ongoing  Patient remains above 90% on room air lung sounds clear. Patient uses incentive spirometer tolerates well. Denies shortness of breath. Problem: GI  Goal: No bowel complications  Outcome: Ongoing  Bowel sounds active. BM this shift. Problem:   Goal: No urinary complication  Outcome: Ongoing      Comments: Care plan reviewed with patient. Patient verbalizes an understanding of plan of care and contributes to goal setting.
Problem: PAIN  Goal: Patient's pain/discomfort is manageable  Pain goal 2-3. Rating pain at 5-7 with oral pain meds given per pt request    Problem: SKIN INTEGRITY  Goal: Skin integrity is maintained or improved  Outcome: Ongoing  Unable to view surgical incision. Pt able to move freely in bed and in chair. No new skin issues noted at this time. Problem: DISCHARGE BARRIERS  Goal: Patient's continuum of care needs are met  Outcome: Ongoing  Per pt planning home with family at discharge. Problem: Falls - Risk of  Goal: Absence of falls  Outcome: Met This Shift  No falls noted this shift. Patient ambulates with x1 staff assistance without difficulty. Bed kept in low position. Safe environment maintained. Bedside table & call light in reach. Uses call light appropriately when needing assistance. Hourly rounding done    Problem: Neurological  Goal: Maximum potential motor/sensory/cognitive function  Outcome: Met This Shift  Alert and oriented x4. No confusion noted. Neurological assessment wnl. Denies numbness and tingling. Problem: Cardiovascular  Goal: No DVT, peripheral vascular complications  Outcome: Met This Shift  No s/s dvt. Problem: Respiratory  Goal: No pulmonary complications  Outcome: Met This Shift  Lungs clear. No chest pain or shortness of breath. Problem: GI  Goal: No bowel complications  Outcome: Ongoing  Abdomen soft with abs. Passing flatus; no bm yet. Mom given and mag citrate ordered per pac/orthopedics. Problem:   Goal: No urinary complication  Outcome: Met This Shift  Voiding quantities sufficient per bathroom privileges. Comments: Care plan reviewed with patient and who verbalizes understanding of the plan of care and contribute to goal setting.
Problem: Pain:  Goal: Pain level will decrease  Pain level will decrease   Outcome: Ongoing  PRN pain medications given per orders. Problem: SKIN INTEGRITY  Goal: Skin integrity is maintained or improved  Outcome: Ongoing  Patient with surgical incision to back. Problem: DISCHARGE BARRIERS  Goal: Patient's continuum of care needs are met  Outcome: Ongoing  Patient plans to return home at discharge. Comments: Care plan reviewed with patient . Patient verbalize understanding of the plan of care and contribute to goal setting.
Problem: Pain:  Goal: Pain level will decrease  Pain level will decrease   Outcome: Ongoing  Taking pain meds as ordered. Pain slightly relieved. Goal: Control of acute pain  Control of acute pain   Outcome: Ongoing  Taking pain meds as prescribed. Pain is tolerable but not gone. Goal: Control of chronic pain  Control of chronic pain   Outcome: Ongoing      Problem: PAIN  Goal: Patient's pain/discomfort is manageable  Outcome: Ongoing      Problem: SKIN INTEGRITY  Goal: Skin integrity is maintained or improved  Outcome: Met This Shift      Problem: DISCHARGE BARRIERS  Goal: Patient's continuum of care needs are met  Outcome: Ongoing  Plan to discharge to home with wife. Comments: Care plan reviewed with patient and family. Patient and family verbalize understanding of the plan of care and contribute to goal setting.
air.  Patient demonstrates appropriate use of incentive spirometer and verbalizes understanding of how often to use IS. Problem: GI  Goal: No bowel complications  Outcome: Ongoing  Patient denies n/v/d, no distention noted, bowel sounds are hypoactive x 4. Patient is passing flatus. Problem:   Goal: No urinary complication  Outcome: Ongoing  Patient voiding clear yellow urine, patient has a Fry catheter in place. Comments: Care plan reviewed with patient. Patient does verbalize understanding of the plan of care and contribute to goal setting.

## 2017-12-10 NOTE — FLOWSHEET NOTE
Wound drains times 2 discontinued as ordered. Tip intact. Back dressing changed using 4 x 4 and secured with tape. No redness noted at incision site.  Steri stips remains on

## 2017-12-10 NOTE — PROGRESS NOTES
Department of Orthopedic Surgery  Spine Service  Attending Progress Note        Subjective:  Had a bowel movement; has been walking and ambulating; ready to go home today    Vitals  VITALS:  /70   Pulse 92   Temp 97.9 °F (36.6 °C) (Oral)   Resp 16   Ht 5' 9\" (1.753 m)   Wt 225 lb 15.5 oz (102.5 kg)   SpO2 96%   BMI 33.37 kg/m²   24HR INTAKE/OUTPUT:    Intake/Output Summary (Last 24 hours) at 12/10/17 1426  Last data filed at 12/10/17 1406   Gross per 24 hour   Intake          2350.42 ml   Output              510 ml   Net          1840.42 ml     URINARY CATHETER OUTPUT (Fry):  [REMOVED] Urethral Catheter Latex 16 fr-Output (mL): 200 mL  DRAIN/TUBE OUTPUT:  Closed/Suction Drain Posterior Back-Output (ml): 10 ml  Closed/Suction Drain Posterior;Right Back Accordion-Output (ml): 20 ml      PHYSICAL EXAM:    Orientation:  alert and oriented to person, place and time    Incision:  dressing in place, clean, dry, intact    Lower Extremity Motor :  quadriceps, extensor hallucis longus, dorsiflexion, plantarflexion 5/5 bilaterally  Lower Extremity Sensory:  Intact L1-S1    Flatus:  positive    ABNORMAL EXAM FINDINGS:  none    LABS:    HgB:    Lab Results   Component Value Date    HGB 10.0 12/10/2017       ASSESSMENT AND PLAN:    Post operative day 4    1:  Remove drain and change dressing  2:  Discharge Planning:  Received 2 unit's blood yesterday and hemoglobin and hematocrit was ordered; Home today. Dr. Maritza PARKER, [facility] [place] [place]    Acute blood loss anemia. Andrew Clark in the setting of lumbar spinal surgery with EBL 2400 (cell saver return 1050) being treated with serial labs, 500 ml albumin,  ml, NS 3500 ml, cell saver 1075 ml, monitoring drainage from hemovac and vital signs.      Patient was treated after blood loss and was given additional blood (2 units of blood) before he was discharge home - VINCENT Garcia

## 2017-12-12 NOTE — CARE COORDINATION
12/12/17, 9:41 AM    Discharge plan discussed by  and . Discharge plan reviewed with patient/ family. Patient/ family verbalize understanding of discharge plan and are in agreement with plan. Understanding was demonstrated using the teach back method.         Was discharged to home on Fabien 12/10/17

## 2018-01-03 DIAGNOSIS — H90.42 SENSORINEURAL HEARING LOSS (SNHL) OF LEFT EAR WITH UNRESTRICTED HEARING OF RIGHT EAR: Primary | ICD-10-CM

## 2018-01-04 ENCOUNTER — HOSPITAL ENCOUNTER (OUTPATIENT)
Dept: AUDIOLOGY | Age: 48
Discharge: HOME | End: 2018-01-04
Payer: COMMERCIAL

## 2018-01-04 ENCOUNTER — OFFICE VISIT (OUTPATIENT)
Dept: ENT CLINIC | Age: 48
End: 2018-01-04
Payer: COMMERCIAL

## 2018-01-04 VITALS
TEMPERATURE: 98.1 F | DIASTOLIC BLOOD PRESSURE: 80 MMHG | BODY MASS INDEX: 33.74 KG/M2 | RESPIRATION RATE: 20 BRPM | HEART RATE: 120 BPM | HEIGHT: 69 IN | SYSTOLIC BLOOD PRESSURE: 120 MMHG | WEIGHT: 227.8 LBS

## 2018-01-04 DIAGNOSIS — H91.22 SUDDEN HEARING LOSS, LEFT: Primary | ICD-10-CM

## 2018-01-04 PROCEDURE — 92567 TYMPANOMETRY: CPT | Performed by: AUDIOLOGIST

## 2018-01-04 PROCEDURE — 99213 OFFICE O/P EST LOW 20 MIN: CPT | Performed by: OTOLARYNGOLOGY

## 2018-01-04 PROCEDURE — 92557 COMPREHENSIVE HEARING TEST: CPT | Performed by: AUDIOLOGIST

## 2018-01-04 ASSESSMENT — ENCOUNTER SYMPTOMS
WHEEZING: 0
COUGH: 0
DIARRHEA: 0
CHEST TIGHTNESS: 0
ANAL BLEEDING: 0
SINUS PRESSURE: 0
EYE ITCHING: 0
EYE PAIN: 0
EYE REDNESS: 0
BACK PAIN: 0
COLOR CHANGE: 0
NAUSEA: 0
SHORTNESS OF BREATH: 0
BLOOD IN STOOL: 0
FACIAL SWELLING: 0
RECTAL PAIN: 0
VOMITING: 0
PHOTOPHOBIA: 0
CHOKING: 0
TROUBLE SWALLOWING: 0
STRIDOR: 0
EYE DISCHARGE: 0
APNEA: 1
ABDOMINAL PAIN: 0
CONSTIPATION: 0
VOICE CHANGE: 0
ABDOMINAL DISTENTION: 0
RHINORRHEA: 0
SORE THROAT: 0

## 2018-01-04 NOTE — PROGRESS NOTES
Subjective:      Patient ID: Bebo Schulz is a 52 y.o. male. Same day audio. Still having trouble with the left ear. HPI: Had sudden hearing loss after surgery a month ago. Was placed on oral steroids. Does not notice any improvement. In for follow up after repeat audiogram      Review of Systems   Constitutional: Negative for activity change, appetite change, chills, diaphoresis, fatigue, fever and unexpected weight change. HENT: Positive for hearing loss and tinnitus. Negative for congestion, dental problem, drooling, ear discharge, ear pain, facial swelling, mouth sores, nosebleeds, postnasal drip, rhinorrhea, sinus pressure, sneezing, sore throat, trouble swallowing and voice change. Eyes: Negative for photophobia, pain, discharge, redness, itching and visual disturbance. Respiratory: Positive for apnea. Negative for cough, choking, chest tightness, shortness of breath, wheezing and stridor. Cardiovascular: Negative for chest pain, palpitations and leg swelling. Gastrointestinal: Negative for abdominal distention, abdominal pain, anal bleeding, blood in stool, constipation, diarrhea, nausea, rectal pain and vomiting. Endocrine: Negative for cold intolerance, heat intolerance, polydipsia, polyphagia and polyuria. Genitourinary: Negative for decreased urine volume, difficulty urinating, discharge, dysuria, enuresis, flank pain, frequency, genital sores, hematuria, penile pain, penile swelling, scrotal swelling, testicular pain and urgency. Musculoskeletal: Negative for arthralgias, back pain, gait problem, joint swelling, myalgias, neck pain and neck stiffness. Skin: Negative for color change, pallor, rash and wound. Allergic/Immunologic: Negative for environmental allergies, food allergies and immunocompromised state. Neurological: Negative for dizziness, tremors, seizures, syncope, speech difficulty, weakness, light-headedness, numbness and headaches.    Hematological: Negative Specialty Services Required     Requested Specialty:   Otolaryngology     Number of Visits Requested:   1

## 2018-01-04 NOTE — PROGRESS NOTES
ACCOUNT #: [de-identified]    AUDIOLOGICAL EVALUATION      REASON FOR TESTING:  Sudden left hearing loss following a spinal surgery in December 2017. Patient took steroids but stated the hearing has not improved in his left ear. Patient has tinnitus in his left ear which sounds like crackling. OTOSCOPY: WNL     AUDIOGRAM        Reliability: good  Audiometer Used:  GSI-61    PURE TONES     RE    LE     [x]   [] WNL        []   [] Mild    []   [] Moderate       []   [] Mod-Severe   []   [x] Severe    []   [] Profound    TYPE     RE    LE    []   [x] SNHL    []   []  Conductive HL    []   [] Mixed HL      CONFIGURATION    RE    LE    []   [] Essentially Flat     []   []  Sloping  []   [] Steeply Sloping  []   [x]  Rising  []   [] Cookie Bite    SPEECH AUDIOMETRY   Right Left Sound Field Aided   PTA 8 CNT     SRT 5      SAT       MASKING       % WRS   QUIET 100 CNT      30 SL      %WRS   NOISE              MCL       UCL            Live Voice  [x]     Recorded  []     List   []     WORD RECOGNITION   RE    LE  [x]   []  Excellent    []   []  Good  []   [] Fair  []   [] Poor  []   [x] Very Poor    TYMPANOGRAMS  RE    LE  [x]   [x]  WNL    []   []  WNL w/reduced mobility  []   [] WNL w/hyper mobility  []   [] Negative pressure  []   [] Flat w/normal ECV  []   [] Flat w/large ECV  []   [] Patent PE tube  []   [] Non-Patent PE tube  []   [] Could Not Test    COMMENTS: Audiometric results indicate normal hearing sensitivity in the right ear and a severe sensorineural hearing loss in the left ear. Results are unchanged from previous results obtained on 12/8/2017. Tympanometry is WNL bilaterally. RECOMMENDATION(S):   1. Follow up is scheduled with Dr. Yelena Dixon of [facility]. 2.  A bone anchored or CROS hearing aid might be considered following all medical management of hearing loss in the left ear.

## 2018-01-10 ENCOUNTER — APPOINTMENT (OUTPATIENT)
Dept: GENERAL RADIOLOGY | Age: 48
DRG: 858 | End: 2018-01-10
Attending: ORTHOPAEDIC SURGERY
Payer: COMMERCIAL

## 2018-01-10 ENCOUNTER — ANESTHESIA (OUTPATIENT)
Dept: OPERATING ROOM | Age: 48
DRG: 858 | End: 2018-01-10
Payer: COMMERCIAL

## 2018-01-10 ENCOUNTER — HOSPITAL ENCOUNTER (INPATIENT)
Age: 48
LOS: 2 days | Discharge: HOME | DRG: 858 | End: 2018-01-12
Attending: ORTHOPAEDIC SURGERY | Admitting: ORTHOPAEDIC SURGERY
Payer: COMMERCIAL

## 2018-01-10 ENCOUNTER — HOSPITAL ENCOUNTER (OUTPATIENT)
Dept: MRI IMAGING | Age: 48
Discharge: HOME | DRG: 858 | End: 2018-01-10
Payer: COMMERCIAL

## 2018-01-10 ENCOUNTER — ANESTHESIA EVENT (OUTPATIENT)
Dept: OPERATING ROOM | Age: 48
DRG: 858 | End: 2018-01-10
Payer: COMMERCIAL

## 2018-01-10 VITALS — TEMPERATURE: 97.5 F | OXYGEN SATURATION: 95 % | SYSTOLIC BLOOD PRESSURE: 147 MMHG | DIASTOLIC BLOOD PRESSURE: 79 MMHG

## 2018-01-10 DIAGNOSIS — H91.22 SUDDEN HEARING LOSS, LEFT: ICD-10-CM

## 2018-01-10 PROBLEM — T81.40XA POSTOPERATIVE INFECTION: Status: ACTIVE | Noted: 2018-01-10

## 2018-01-10 LAB
ABO: NORMAL
ANTIBODY SCREEN: NORMAL
RH FACTOR: NORMAL

## 2018-01-10 PROCEDURE — A4450 NON-WATERPROOF TAPE: HCPCS | Performed by: ORTHOPAEDIC SURGERY

## 2018-01-10 PROCEDURE — 3600000014 HC SURGERY LEVEL 4 ADDTL 15MIN: Performed by: ORTHOPAEDIC SURGERY

## 2018-01-10 PROCEDURE — A6258 TRANSPARENT FILM >16<=48 IN: HCPCS | Performed by: ORTHOPAEDIC SURGERY

## 2018-01-10 PROCEDURE — 87147 CULTURE TYPE IMMUNOLOGIC: CPT

## 2018-01-10 PROCEDURE — 87077 CULTURE AEROBIC IDENTIFY: CPT

## 2018-01-10 PROCEDURE — 6370000000 HC RX 637 (ALT 250 FOR IP): Performed by: ORTHOPAEDIC SURGERY

## 2018-01-10 PROCEDURE — 2500000003 HC RX 250 WO HCPCS: Performed by: REGISTERED NURSE

## 2018-01-10 PROCEDURE — 3700000001 HC ADD 15 MINUTES (ANESTHESIA): Performed by: ORTHOPAEDIC SURGERY

## 2018-01-10 PROCEDURE — 2580000003 HC RX 258: Performed by: PHYSICIAN ASSISTANT

## 2018-01-10 PROCEDURE — 86923 COMPATIBILITY TEST ELECTRIC: CPT

## 2018-01-10 PROCEDURE — 86900 BLOOD TYPING SEROLOGIC ABO: CPT

## 2018-01-10 PROCEDURE — 6360000002 HC RX W HCPCS: Performed by: PHYSICIAN ASSISTANT

## 2018-01-10 PROCEDURE — 87070 CULTURE OTHR SPECIMN AEROBIC: CPT

## 2018-01-10 PROCEDURE — 3600000004 HC SURGERY LEVEL 4 BASE: Performed by: ORTHOPAEDIC SURGERY

## 2018-01-10 PROCEDURE — 87075 CULTR BACTERIA EXCEPT BLOOD: CPT

## 2018-01-10 PROCEDURE — 93005 ELECTROCARDIOGRAM TRACING: CPT

## 2018-01-10 PROCEDURE — 7100000000 HC PACU RECOVERY - FIRST 15 MIN: Performed by: ORTHOPAEDIC SURGERY

## 2018-01-10 PROCEDURE — 1200000000 HC SEMI PRIVATE

## 2018-01-10 PROCEDURE — 87186 SC STD MICRODIL/AGAR DIL: CPT

## 2018-01-10 PROCEDURE — 6360000002 HC RX W HCPCS: Performed by: ANESTHESIOLOGY

## 2018-01-10 PROCEDURE — 3700000000 HC ANESTHESIA ATTENDED CARE: Performed by: ORTHOPAEDIC SURGERY

## 2018-01-10 PROCEDURE — 71045 X-RAY EXAM CHEST 1 VIEW: CPT

## 2018-01-10 PROCEDURE — A6257 TRANSPARENT FILM <= 16 SQ IN: HCPCS | Performed by: ORTHOPAEDIC SURGERY

## 2018-01-10 PROCEDURE — A6222 GAUZE <=16 IN NO W/SAL W/O B: HCPCS | Performed by: ORTHOPAEDIC SURGERY

## 2018-01-10 PROCEDURE — 2580000003 HC RX 258: Performed by: ORTHOPAEDIC SURGERY

## 2018-01-10 PROCEDURE — 86901 BLOOD TYPING SEROLOGIC RH(D): CPT

## 2018-01-10 PROCEDURE — 86850 RBC ANTIBODY SCREEN: CPT

## 2018-01-10 PROCEDURE — 0JB70ZZ EXCISION OF BACK SUBCUTANEOUS TISSUE AND FASCIA, OPEN APPROACH: ICD-10-PCS | Performed by: ORTHOPAEDIC SURGERY

## 2018-01-10 PROCEDURE — 2500000003 HC RX 250 WO HCPCS: Performed by: ORTHOPAEDIC SURGERY

## 2018-01-10 PROCEDURE — A9579 GAD-BASE MR CONTRAST NOS,1ML: HCPCS | Performed by: OTOLARYNGOLOGY

## 2018-01-10 PROCEDURE — 6360000004 HC RX CONTRAST MEDICATION: Performed by: OTOLARYNGOLOGY

## 2018-01-10 PROCEDURE — 7100000001 HC PACU RECOVERY - ADDTL 15 MIN: Performed by: ORTHOPAEDIC SURGERY

## 2018-01-10 PROCEDURE — 36415 COLL VENOUS BLD VENIPUNCTURE: CPT

## 2018-01-10 PROCEDURE — 6360000002 HC RX W HCPCS: Performed by: REGISTERED NURSE

## 2018-01-10 PROCEDURE — 87205 SMEAR GRAM STAIN: CPT

## 2018-01-10 PROCEDURE — 70553 MRI BRAIN STEM W/O & W/DYE: CPT

## 2018-01-10 RX ORDER — OXYCODONE HYDROCHLORIDE AND ACETAMINOPHEN 5; 325 MG/1; MG/1
1 TABLET ORAL EVERY 8 HOURS PRN
Status: ON HOLD | COMMUNITY
End: 2018-01-12

## 2018-01-10 RX ORDER — ONDANSETRON 2 MG/ML
4 INJECTION INTRAMUSCULAR; INTRAVENOUS EVERY 6 HOURS PRN
Status: DISCONTINUED | OUTPATIENT
Start: 2018-01-10 | End: 2018-01-12 | Stop reason: HOSPADM

## 2018-01-10 RX ORDER — LIDOCAINE HYDROCHLORIDE AND EPINEPHRINE 10; 10 MG/ML; UG/ML
INJECTION, SOLUTION INFILTRATION; PERINEURAL PRN
Status: DISCONTINUED | OUTPATIENT
Start: 2018-01-10 | End: 2018-01-10 | Stop reason: HOSPADM

## 2018-01-10 RX ORDER — ONDANSETRON 2 MG/ML
4 INJECTION INTRAMUSCULAR; INTRAVENOUS
Status: DISCONTINUED | OUTPATIENT
Start: 2018-01-10 | End: 2018-01-10 | Stop reason: HOSPADM

## 2018-01-10 RX ORDER — SODIUM CHLORIDE 0.9 % (FLUSH) 0.9 %
10 SYRINGE (ML) INJECTION PRN
Status: DISCONTINUED | OUTPATIENT
Start: 2018-01-10 | End: 2018-01-10

## 2018-01-10 RX ORDER — HYDROCODONE BITARTRATE AND ACETAMINOPHEN 5; 325 MG/1; MG/1
1 TABLET ORAL EVERY 4 HOURS PRN
Status: DISCONTINUED | OUTPATIENT
Start: 2018-01-10 | End: 2018-01-10

## 2018-01-10 RX ORDER — PANTOPRAZOLE SODIUM 40 MG/1
40 TABLET, DELAYED RELEASE ORAL
Status: DISCONTINUED | OUTPATIENT
Start: 2018-01-11 | End: 2018-01-12 | Stop reason: HOSPADM

## 2018-01-10 RX ORDER — PROPOFOL 10 MG/ML
INJECTION, EMULSION INTRAVENOUS PRN
Status: DISCONTINUED | OUTPATIENT
Start: 2018-01-10 | End: 2018-01-10 | Stop reason: SDUPTHER

## 2018-01-10 RX ORDER — 0.9 % SODIUM CHLORIDE 0.9 %
250 INTRAVENOUS SOLUTION INTRAVENOUS ONCE
Status: DISCONTINUED | OUTPATIENT
Start: 2018-01-10 | End: 2018-01-10

## 2018-01-10 RX ORDER — LABETALOL HYDROCHLORIDE 5 MG/ML
5 INJECTION, SOLUTION INTRAVENOUS EVERY 5 MIN PRN
Status: DISCONTINUED | OUTPATIENT
Start: 2018-01-10 | End: 2018-01-10 | Stop reason: HOSPADM

## 2018-01-10 RX ORDER — GLYCOPYRROLATE 0.2 MG/ML
INJECTION INTRAMUSCULAR; INTRAVENOUS PRN
Status: DISCONTINUED | OUTPATIENT
Start: 2018-01-10 | End: 2018-01-10 | Stop reason: SDUPTHER

## 2018-01-10 RX ORDER — DIPHENHYDRAMINE HYDROCHLORIDE 50 MG/ML
12.5 INJECTION INTRAMUSCULAR; INTRAVENOUS
Status: DISCONTINUED | OUTPATIENT
Start: 2018-01-10 | End: 2018-01-10 | Stop reason: HOSPADM

## 2018-01-10 RX ORDER — SODIUM CHLORIDE 9 MG/ML
INJECTION, SOLUTION INTRAVENOUS CONTINUOUS
Status: DISCONTINUED | OUTPATIENT
Start: 2018-01-10 | End: 2018-01-10

## 2018-01-10 RX ORDER — MEPERIDINE HYDROCHLORIDE 25 MG/ML
12.5 INJECTION INTRAMUSCULAR; INTRAVENOUS; SUBCUTANEOUS EVERY 5 MIN PRN
Status: DISCONTINUED | OUTPATIENT
Start: 2018-01-10 | End: 2018-01-10 | Stop reason: HOSPADM

## 2018-01-10 RX ORDER — SODIUM CHLORIDE 0.9 % (FLUSH) 0.9 %
10 SYRINGE (ML) INJECTION PRN
Status: DISCONTINUED | OUTPATIENT
Start: 2018-01-10 | End: 2018-01-12 | Stop reason: HOSPADM

## 2018-01-10 RX ORDER — BISACODYL 10 MG
10 SUPPOSITORY, RECTAL RECTAL DAILY PRN
Status: DISCONTINUED | OUTPATIENT
Start: 2018-01-10 | End: 2018-01-10

## 2018-01-10 RX ORDER — FLUOXETINE HYDROCHLORIDE 20 MG/1
40 CAPSULE ORAL NIGHTLY
Status: DISCONTINUED | OUTPATIENT
Start: 2018-01-10 | End: 2018-01-12 | Stop reason: HOSPADM

## 2018-01-10 RX ORDER — LIDOCAINE HYDROCHLORIDE 20 MG/ML
INJECTION, SOLUTION INFILTRATION; PERINEURAL PRN
Status: DISCONTINUED | OUTPATIENT
Start: 2018-01-10 | End: 2018-01-10 | Stop reason: SDUPTHER

## 2018-01-10 RX ORDER — SODIUM CHLORIDE 0.9 % (FLUSH) 0.9 %
10 SYRINGE (ML) INJECTION EVERY 12 HOURS SCHEDULED
Status: DISCONTINUED | OUTPATIENT
Start: 2018-01-10 | End: 2018-01-12 | Stop reason: HOSPADM

## 2018-01-10 RX ORDER — BACITRACIN 50000 [USP'U]/1
INJECTION, POWDER, LYOPHILIZED, FOR SOLUTION INTRAMUSCULAR PRN
Status: DISCONTINUED | OUTPATIENT
Start: 2018-01-10 | End: 2018-01-10 | Stop reason: HOSPADM

## 2018-01-10 RX ORDER — NEOSTIGMINE METHYLSULFATE 1 MG/ML
INJECTION, SOLUTION INTRAVENOUS PRN
Status: DISCONTINUED | OUTPATIENT
Start: 2018-01-10 | End: 2018-01-10 | Stop reason: SDUPTHER

## 2018-01-10 RX ORDER — OXYCODONE HYDROCHLORIDE AND ACETAMINOPHEN 5; 325 MG/1; MG/1
2 TABLET ORAL EVERY 4 HOURS PRN
Status: DISCONTINUED | OUTPATIENT
Start: 2018-01-10 | End: 2018-01-12 | Stop reason: HOSPADM

## 2018-01-10 RX ORDER — GABAPENTIN 300 MG/1
600 CAPSULE ORAL 3 TIMES DAILY
Status: DISCONTINUED | OUTPATIENT
Start: 2018-01-10 | End: 2018-01-12 | Stop reason: HOSPADM

## 2018-01-10 RX ORDER — HYDRALAZINE HYDROCHLORIDE 20 MG/ML
5 INJECTION INTRAMUSCULAR; INTRAVENOUS EVERY 10 MIN PRN
Status: DISCONTINUED | OUTPATIENT
Start: 2018-01-10 | End: 2018-01-10 | Stop reason: HOSPADM

## 2018-01-10 RX ORDER — ACETAMINOPHEN 650 MG/1
650 SUPPOSITORY RECTAL EVERY 4 HOURS PRN
Status: DISCONTINUED | OUTPATIENT
Start: 2018-01-10 | End: 2018-01-12 | Stop reason: HOSPADM

## 2018-01-10 RX ORDER — OMEPRAZOLE 20 MG/1
20 CAPSULE, DELAYED RELEASE ORAL DAILY
Status: DISCONTINUED | OUTPATIENT
Start: 2018-01-10 | End: 2018-01-10 | Stop reason: CLARIF

## 2018-01-10 RX ORDER — ACETAMINOPHEN 325 MG/1
650 TABLET ORAL EVERY 4 HOURS PRN
Status: DISCONTINUED | OUTPATIENT
Start: 2018-01-10 | End: 2018-01-10

## 2018-01-10 RX ORDER — DOCUSATE SODIUM 100 MG/1
100 CAPSULE, LIQUID FILLED ORAL 2 TIMES DAILY
Status: DISCONTINUED | OUTPATIENT
Start: 2018-01-10 | End: 2018-01-10

## 2018-01-10 RX ORDER — CYCLOBENZAPRINE HCL 10 MG
10 TABLET ORAL NIGHTLY PRN
Status: ON HOLD | COMMUNITY
End: 2018-01-12

## 2018-01-10 RX ORDER — SODIUM CHLORIDE 9 MG/ML
INJECTION, SOLUTION INTRAVENOUS CONTINUOUS
Status: DISCONTINUED | OUTPATIENT
Start: 2018-01-10 | End: 2018-01-12 | Stop reason: HOSPADM

## 2018-01-10 RX ORDER — SODIUM CHLORIDE 0.9 % (FLUSH) 0.9 %
10 SYRINGE (ML) INJECTION EVERY 12 HOURS SCHEDULED
Status: DISCONTINUED | OUTPATIENT
Start: 2018-01-10 | End: 2018-01-10

## 2018-01-10 RX ORDER — KETAMINE HYDROCHLORIDE 50 MG/ML
INJECTION, SOLUTION, CONCENTRATE INTRAMUSCULAR; INTRAVENOUS PRN
Status: DISCONTINUED | OUTPATIENT
Start: 2018-01-10 | End: 2018-01-10 | Stop reason: SDUPTHER

## 2018-01-10 RX ORDER — OXYCODONE HYDROCHLORIDE AND ACETAMINOPHEN 5; 325 MG/1; MG/1
1 TABLET ORAL EVERY 4 HOURS PRN
Status: DISCONTINUED | OUTPATIENT
Start: 2018-01-10 | End: 2018-01-12 | Stop reason: HOSPADM

## 2018-01-10 RX ORDER — DOCUSATE SODIUM 100 MG/1
100 CAPSULE, LIQUID FILLED ORAL 2 TIMES DAILY
Status: DISCONTINUED | OUTPATIENT
Start: 2018-01-10 | End: 2018-01-12 | Stop reason: HOSPADM

## 2018-01-10 RX ORDER — FENTANYL CITRATE 50 UG/ML
INJECTION, SOLUTION INTRAMUSCULAR; INTRAVENOUS PRN
Status: DISCONTINUED | OUTPATIENT
Start: 2018-01-10 | End: 2018-01-10 | Stop reason: SDUPTHER

## 2018-01-10 RX ORDER — ONDANSETRON 2 MG/ML
INJECTION INTRAMUSCULAR; INTRAVENOUS PRN
Status: DISCONTINUED | OUTPATIENT
Start: 2018-01-10 | End: 2018-01-10 | Stop reason: SDUPTHER

## 2018-01-10 RX ORDER — ACETAMINOPHEN 325 MG/1
650 TABLET ORAL EVERY 4 HOURS PRN
Status: DISCONTINUED | OUTPATIENT
Start: 2018-01-10 | End: 2018-01-12 | Stop reason: HOSPADM

## 2018-01-10 RX ORDER — MIDAZOLAM HYDROCHLORIDE 1 MG/ML
INJECTION INTRAMUSCULAR; INTRAVENOUS PRN
Status: DISCONTINUED | OUTPATIENT
Start: 2018-01-10 | End: 2018-01-10 | Stop reason: SDUPTHER

## 2018-01-10 RX ORDER — ONDANSETRON 2 MG/ML
4 INJECTION INTRAMUSCULAR; INTRAVENOUS EVERY 6 HOURS PRN
Status: DISCONTINUED | OUTPATIENT
Start: 2018-01-10 | End: 2018-01-10

## 2018-01-10 RX ORDER — ROCURONIUM BROMIDE 10 MG/ML
INJECTION, SOLUTION INTRAVENOUS PRN
Status: DISCONTINUED | OUTPATIENT
Start: 2018-01-10 | End: 2018-01-10 | Stop reason: SDUPTHER

## 2018-01-10 RX ORDER — FENTANYL CITRATE 50 UG/ML
50 INJECTION, SOLUTION INTRAMUSCULAR; INTRAVENOUS EVERY 5 MIN PRN
Status: DISCONTINUED | OUTPATIENT
Start: 2018-01-10 | End: 2018-01-10 | Stop reason: HOSPADM

## 2018-01-10 RX ORDER — MORPHINE SULFATE 2 MG/ML
2 INJECTION, SOLUTION INTRAMUSCULAR; INTRAVENOUS EVERY 5 MIN PRN
Status: DISCONTINUED | OUTPATIENT
Start: 2018-01-10 | End: 2018-01-10 | Stop reason: HOSPADM

## 2018-01-10 RX ORDER — CYCLOBENZAPRINE HCL 10 MG
10 TABLET ORAL 3 TIMES DAILY PRN
Status: DISCONTINUED | OUTPATIENT
Start: 2018-01-10 | End: 2018-01-12 | Stop reason: HOSPADM

## 2018-01-10 RX ADMIN — FENTANYL CITRATE 100 MCG: 50 INJECTION INTRAMUSCULAR; INTRAVENOUS at 15:43

## 2018-01-10 RX ADMIN — GLYCOPYRROLATE 0.2 MG: 0.2 INJECTION, SOLUTION INTRAMUSCULAR; INTRAVENOUS at 16:29

## 2018-01-10 RX ADMIN — CYCLOBENZAPRINE HYDROCHLORIDE 10 MG: 10 TABLET, FILM COATED ORAL at 21:56

## 2018-01-10 RX ADMIN — GABAPENTIN 600 MG: 300 CAPSULE ORAL at 21:55

## 2018-01-10 RX ADMIN — MORPHINE SULFATE 2 MG: 2 INJECTION, SOLUTION INTRAMUSCULAR; INTRAVENOUS at 17:30

## 2018-01-10 RX ADMIN — SODIUM CHLORIDE: 9 INJECTION, SOLUTION INTRAVENOUS at 16:29

## 2018-01-10 RX ADMIN — ONDANSETRON 4 MG: 2 INJECTION INTRAMUSCULAR; INTRAVENOUS at 16:11

## 2018-01-10 RX ADMIN — PHENYLEPHRINE HYDROCHLORIDE 100 MCG: 10 INJECTION INTRAMUSCULAR; INTRAVENOUS; SUBCUTANEOUS at 16:07

## 2018-01-10 RX ADMIN — HYDROCODONE BITARTRATE AND ACETAMINOPHEN 1 TABLET: 5; 325 TABLET ORAL at 12:52

## 2018-01-10 RX ADMIN — Medication 20 MG: at 15:45

## 2018-01-10 RX ADMIN — SODIUM CHLORIDE: 9 INJECTION, SOLUTION INTRAVENOUS at 21:59

## 2018-01-10 RX ADMIN — FENTANYL CITRATE 50 MCG: 50 INJECTION, SOLUTION INTRAMUSCULAR; INTRAVENOUS at 17:40

## 2018-01-10 RX ADMIN — FENTANYL CITRATE 50 MCG: 50 INJECTION INTRAMUSCULAR; INTRAVENOUS at 16:45

## 2018-01-10 RX ADMIN — SODIUM CHLORIDE: 9 INJECTION, SOLUTION INTRAVENOUS at 13:31

## 2018-01-10 RX ADMIN — DOCUSATE SODIUM 100 MG: 100 CAPSULE ORAL at 21:55

## 2018-01-10 RX ADMIN — PHENYLEPHRINE HYDROCHLORIDE 200 MCG: 10 INJECTION INTRAMUSCULAR; INTRAVENOUS; SUBCUTANEOUS at 16:13

## 2018-01-10 RX ADMIN — FENTANYL CITRATE 50 MCG: 50 INJECTION, SOLUTION INTRAMUSCULAR; INTRAVENOUS at 17:45

## 2018-01-10 RX ADMIN — MIDAZOLAM HYDROCHLORIDE 2 MG: 1 INJECTION, SOLUTION INTRAMUSCULAR; INTRAVENOUS at 15:42

## 2018-01-10 RX ADMIN — GLYCOPYRROLATE 0.2 MG: 0.2 INJECTION, SOLUTION INTRAMUSCULAR; INTRAVENOUS at 15:42

## 2018-01-10 RX ADMIN — GADOTERIDOL 20 ML: 279.3 INJECTION, SOLUTION INTRAVENOUS at 09:14

## 2018-01-10 RX ADMIN — KETAMINE HYDROCHLORIDE 100 MG: 50 INJECTION, SOLUTION INTRAMUSCULAR; INTRAVENOUS at 16:11

## 2018-01-10 RX ADMIN — PHENYLEPHRINE HYDROCHLORIDE 100 MCG: 10 INJECTION INTRAMUSCULAR; INTRAVENOUS; SUBCUTANEOUS at 15:50

## 2018-01-10 RX ADMIN — PROPOFOL 200 MG: 10 INJECTION, EMULSION INTRAVENOUS at 15:45

## 2018-01-10 RX ADMIN — CEFAZOLIN SODIUM 2 G: 2 SOLUTION INTRAVENOUS at 16:14

## 2018-01-10 RX ADMIN — FENTANYL CITRATE 100 MCG: 50 INJECTION INTRAMUSCULAR; INTRAVENOUS at 16:11

## 2018-01-10 RX ADMIN — FLUOXETINE 40 MG: 20 CAPSULE ORAL at 21:55

## 2018-01-10 RX ADMIN — OXYCODONE HYDROCHLORIDE AND ACETAMINOPHEN 1 TABLET: 5; 325 TABLET ORAL at 21:56

## 2018-01-10 RX ADMIN — NEOSTIGMINE METHYLSULFATE 3 MG: 1 INJECTION, SOLUTION INTRAVENOUS at 16:29

## 2018-01-10 RX ADMIN — LIDOCAINE HYDROCHLORIDE 100 MG: 20 INJECTION, SOLUTION INFILTRATION; PERINEURAL at 15:45

## 2018-01-10 RX ADMIN — PHENYLEPHRINE HYDROCHLORIDE 200 MCG: 10 INJECTION INTRAMUSCULAR; INTRAVENOUS; SUBCUTANEOUS at 16:21

## 2018-01-10 RX ADMIN — MORPHINE SULFATE 2 MG: 2 INJECTION, SOLUTION INTRAMUSCULAR; INTRAVENOUS at 17:25

## 2018-01-10 ASSESSMENT — PULMONARY FUNCTION TESTS
PIF_VALUE: 2
PIF_VALUE: 18
PIF_VALUE: 22
PIF_VALUE: 1
PIF_VALUE: 21
PIF_VALUE: 1
PIF_VALUE: 20
PIF_VALUE: 20
PIF_VALUE: 6
PIF_VALUE: 21
PIF_VALUE: 18
PIF_VALUE: 21
PIF_VALUE: 21
PIF_VALUE: 4
PIF_VALUE: 21
PIF_VALUE: 3
PIF_VALUE: 21
PIF_VALUE: 1
PIF_VALUE: 21
PIF_VALUE: 20
PIF_VALUE: 21
PIF_VALUE: 21
PIF_VALUE: 37
PIF_VALUE: 1
PIF_VALUE: 21
PIF_VALUE: 24
PIF_VALUE: 18
PIF_VALUE: 20
PIF_VALUE: 20
PIF_VALUE: 22
PIF_VALUE: 21
PIF_VALUE: 1
PIF_VALUE: 21
PIF_VALUE: 18
PIF_VALUE: 21
PIF_VALUE: 21
PIF_VALUE: 2
PIF_VALUE: 24
PIF_VALUE: 21
PIF_VALUE: 20
PIF_VALUE: 35
PIF_VALUE: 3
PIF_VALUE: 21
PIF_VALUE: 1
PIF_VALUE: 20
PIF_VALUE: 20
PIF_VALUE: 18
PIF_VALUE: 6
PIF_VALUE: 4
PIF_VALUE: 5
PIF_VALUE: 21
PIF_VALUE: 20
PIF_VALUE: 1
PIF_VALUE: 22
PIF_VALUE: 21
PIF_VALUE: 21
PIF_VALUE: 20
PIF_VALUE: 18
PIF_VALUE: 21
PIF_VALUE: 18
PIF_VALUE: 21
PIF_VALUE: 20

## 2018-01-10 ASSESSMENT — PAIN SCALES - GENERAL
PAINLEVEL_OUTOF10: 7
PAINLEVEL_OUTOF10: 7
PAINLEVEL_OUTOF10: 3
PAINLEVEL_OUTOF10: 4
PAINLEVEL_OUTOF10: 6
PAINLEVEL_OUTOF10: 6
PAINLEVEL_OUTOF10: 7
PAINLEVEL_OUTOF10: 5

## 2018-01-10 ASSESSMENT — PAIN DESCRIPTION - PROGRESSION: CLINICAL_PROGRESSION: NOT CHANGED

## 2018-01-10 ASSESSMENT — PAIN DESCRIPTION - PAIN TYPE
TYPE: SURGICAL PAIN
TYPE: SURGICAL PAIN

## 2018-01-10 ASSESSMENT — PAIN DESCRIPTION - ORIENTATION
ORIENTATION: LOWER
ORIENTATION: LOWER

## 2018-01-10 ASSESSMENT — PAIN DESCRIPTION - FREQUENCY: FREQUENCY: CONTINUOUS

## 2018-01-10 ASSESSMENT — PAIN DESCRIPTION - LOCATION
LOCATION: BACK
LOCATION: BACK

## 2018-01-10 ASSESSMENT — PAIN DESCRIPTION - DESCRIPTORS
DESCRIPTORS: BURNING
DESCRIPTORS: BURNING

## 2018-01-10 ASSESSMENT — PAIN DESCRIPTION - ONSET: ONSET: ON-GOING

## 2018-01-10 NOTE — ANESTHESIA PRE PROCEDURE
 Prolonged emergence from general anesthesia        Past Surgical History:        Procedure Laterality Date    COLONOSCOPY  2014    HARDWARE REMOVAL  2013    Left side    HARDWARE REMOVAL  2013    Right Side    LAMINECTOMY      LUMBAR FUSION N/A 12/6/2017    LUMBAR LAMINECTOMY WITH PSF L3-S1 WITH PLIF L5-S1 WITH SOLERA 5.5, CAPSTONE ALVAREZ WITH DBM LOCAL BONE GRAFTING performed by Dayna Gutierrez MD at [facility]  2004    Both feet    SPINAL FUSION  2012    L3-4 L4-5 [address]  2005       Social History:    Social History   Substance Use Topics    Smoking status: Never Smoker    Smokeless tobacco: Never Used    Alcohol use Yes      Comment: social                                Counseling given: Not Answered      Vital Signs (Current):   Vitals:    01/10/18 0950   BP: 124/83   Pulse: 96   Resp: 16   Temp: 97.8 °F (36.6 °C)   TempSrc: Oral   SpO2: 97%                                              BP Readings from Last 3 Encounters:   01/10/18 124/83   01/04/18 120/80   12/10/17 114/70       NPO Status:                                                                                 BMI:   Wt Readings from Last 3 Encounters:   01/04/18 227 lb 12.8 oz (103.3 kg)   12/07/17 225 lb 15.5 oz (102.5 kg)   11/09/17 222 lb 10.6 oz (101 kg)     There is no height or weight on file to calculate BMI.    CBC:   Lab Results   Component Value Date    WBC 5.1 12/09/2017    RBC 2.47 12/09/2017    HGB 10.0 12/10/2017    HCT 29.8 12/10/2017    MCV 91.6 12/09/2017    RDW 13.2 12/09/2017     12/09/2017       CMP:   Lab Results   Component Value Date     12/09/2017    K 4.7 12/09/2017     12/09/2017    CO2 29 12/09/2017    BUN 9 12/09/2017    CREATININE 0.8 12/09/2017    LABGLOM >90 12/09/2017    GLUCOSE 146 12/09/2017    PROT 5.0 12/06/2017    CALCIUM 8.5 12/09/2017    BILITOT 1.0 12/06/2017    ALKPHOS 39 12/06/2017    AST 29

## 2018-01-11 PROBLEM — D64.9 POSTOPERATIVE ANEMIA: Status: ACTIVE | Noted: 2018-01-11

## 2018-01-11 PROBLEM — G89.18 PAIN, POSTOPERATIVE, ACUTE: Status: ACTIVE | Noted: 2018-01-11

## 2018-01-11 LAB
BASOPHILS # BLD: 0.3 %
BASOPHILS ABSOLUTE: 0 THOU/MM3 (ref 0–0.1)
C-REACTIVE PROTEIN: 9.24 MG/DL (ref 0–1)
EOSINOPHIL # BLD: 1.7 %
EOSINOPHILS ABSOLUTE: 0.1 THOU/MM3 (ref 0–0.4)
HCT VFR BLD CALC: 31.8 % (ref 42–52)
HEMOGLOBIN: 10.6 GM/DL (ref 14–18)
LYMPHOCYTES # BLD: 25.1 %
LYMPHOCYTES ABSOLUTE: 1 THOU/MM3 (ref 1–4.8)
MCH RBC QN AUTO: 30 PG (ref 27–31)
MCHC RBC AUTO-ENTMCNC: 33.3 GM/DL (ref 33–37)
MCV RBC AUTO: 90.3 FL (ref 80–94)
MONOCYTES # BLD: 15.2 %
MONOCYTES ABSOLUTE: 0.6 THOU/MM3 (ref 0.4–1.3)
NUCLEATED RED BLOOD CELLS: 0 /100 WBC
PDW BLD-RTO: 13.2 % (ref 11.5–14.5)
PLATELET # BLD: 189 THOU/MM3 (ref 130–400)
PMV BLD AUTO: 6.9 MCM (ref 7.4–10.4)
RBC # BLD: 3.52 MILL/MM3 (ref 4.7–6.1)
SEG NEUTROPHILS: 57.7 %
SEGMENTED NEUTROPHILS ABSOLUTE COUNT: 2.3 THOU/MM3 (ref 1.8–7.7)
WBC # BLD: 4 THOU/MM3 (ref 4.8–10.8)

## 2018-01-11 PROCEDURE — G8987 SELF CARE CURRENT STATUS: HCPCS

## 2018-01-11 PROCEDURE — 85025 COMPLETE CBC W/AUTO DIFF WBC: CPT

## 2018-01-11 PROCEDURE — 97165 OT EVAL LOW COMPLEX 30 MIN: CPT

## 2018-01-11 PROCEDURE — 6370000000 HC RX 637 (ALT 250 FOR IP): Performed by: ORTHOPAEDIC SURGERY

## 2018-01-11 PROCEDURE — 6370000000 HC RX 637 (ALT 250 FOR IP): Performed by: OPHTHALMOLOGY

## 2018-01-11 PROCEDURE — 6360000002 HC RX W HCPCS: Performed by: ORTHOPAEDIC SURGERY

## 2018-01-11 PROCEDURE — G8980 MOBILITY D/C STATUS: HCPCS

## 2018-01-11 PROCEDURE — 6360000002 HC RX W HCPCS: Performed by: INTERNAL MEDICINE

## 2018-01-11 PROCEDURE — 99222 1ST HOSP IP/OBS MODERATE 55: CPT | Performed by: OPHTHALMOLOGY

## 2018-01-11 PROCEDURE — 86140 C-REACTIVE PROTEIN: CPT

## 2018-01-11 PROCEDURE — G8988 SELF CARE GOAL STATUS: HCPCS

## 2018-01-11 PROCEDURE — 97161 PT EVAL LOW COMPLEX 20 MIN: CPT

## 2018-01-11 PROCEDURE — G8989 SELF CARE D/C STATUS: HCPCS

## 2018-01-11 PROCEDURE — 97535 SELF CARE MNGMENT TRAINING: CPT

## 2018-01-11 PROCEDURE — 1200000000 HC SEMI PRIVATE

## 2018-01-11 PROCEDURE — 2580000003 HC RX 258: Performed by: ORTHOPAEDIC SURGERY

## 2018-01-11 PROCEDURE — G8979 MOBILITY GOAL STATUS: HCPCS

## 2018-01-11 PROCEDURE — G8978 MOBILITY CURRENT STATUS: HCPCS

## 2018-01-11 PROCEDURE — 36415 COLL VENOUS BLD VENIPUNCTURE: CPT

## 2018-01-11 RX ORDER — GUAIFENESIN 600 MG/1
600 TABLET, EXTENDED RELEASE ORAL 2 TIMES DAILY
Status: DISCONTINUED | OUTPATIENT
Start: 2018-01-11 | End: 2018-01-12 | Stop reason: HOSPADM

## 2018-01-11 RX ADMIN — OXYCODONE HYDROCHLORIDE AND ACETAMINOPHEN 1 TABLET: 5; 325 TABLET ORAL at 09:00

## 2018-01-11 RX ADMIN — DOCUSATE SODIUM 100 MG: 100 CAPSULE ORAL at 09:00

## 2018-01-11 RX ADMIN — GUAIFENESIN 600 MG: 600 TABLET, EXTENDED RELEASE ORAL at 21:47

## 2018-01-11 RX ADMIN — SODIUM CHLORIDE: 9 INJECTION, SOLUTION INTRAVENOUS at 21:47

## 2018-01-11 RX ADMIN — GABAPENTIN 600 MG: 300 CAPSULE ORAL at 14:06

## 2018-01-11 RX ADMIN — SODIUM CHLORIDE: 9 INJECTION, SOLUTION INTRAVENOUS at 00:14

## 2018-01-11 RX ADMIN — PANTOPRAZOLE SODIUM 40 MG: 40 TABLET, DELAYED RELEASE ORAL at 05:55

## 2018-01-11 RX ADMIN — SODIUM CHLORIDE: 9 INJECTION, SOLUTION INTRAVENOUS at 11:26

## 2018-01-11 RX ADMIN — GABAPENTIN 600 MG: 300 CAPSULE ORAL at 21:47

## 2018-01-11 RX ADMIN — OXYCODONE HYDROCHLORIDE AND ACETAMINOPHEN 1 TABLET: 5; 325 TABLET ORAL at 18:25

## 2018-01-11 RX ADMIN — CEFAZOLIN SODIUM 1 G: 1 INJECTION, SOLUTION INTRAVENOUS at 17:35

## 2018-01-11 RX ADMIN — GABAPENTIN 600 MG: 300 CAPSULE ORAL at 09:00

## 2018-01-11 RX ADMIN — FLUOXETINE 40 MG: 20 CAPSULE ORAL at 21:46

## 2018-01-11 RX ADMIN — OXYCODONE HYDROCHLORIDE AND ACETAMINOPHEN 1 TABLET: 5; 325 TABLET ORAL at 04:21

## 2018-01-11 RX ADMIN — CEFAZOLIN SODIUM 2 G: 2 SOLUTION INTRAVENOUS at 00:14

## 2018-01-11 RX ADMIN — CYCLOBENZAPRINE HYDROCHLORIDE 10 MG: 10 TABLET, FILM COATED ORAL at 21:47

## 2018-01-11 RX ADMIN — DOCUSATE SODIUM 100 MG: 100 CAPSULE ORAL at 21:47

## 2018-01-11 RX ADMIN — CEFAZOLIN SODIUM 2 G: 2 SOLUTION INTRAVENOUS at 09:06

## 2018-01-11 RX ADMIN — OXYCODONE HYDROCHLORIDE AND ACETAMINOPHEN 1 TABLET: 5; 325 TABLET ORAL at 14:06

## 2018-01-11 ASSESSMENT — PAIN DESCRIPTION - DESCRIPTORS
DESCRIPTORS: BURNING

## 2018-01-11 ASSESSMENT — PAIN DESCRIPTION - ONSET
ONSET: ON-GOING
ONSET: ON-GOING

## 2018-01-11 ASSESSMENT — PAIN DESCRIPTION - LOCATION
LOCATION: BACK

## 2018-01-11 ASSESSMENT — PAIN DESCRIPTION - FREQUENCY
FREQUENCY: CONTINUOUS

## 2018-01-11 ASSESSMENT — PAIN DESCRIPTION - PAIN TYPE
TYPE: SURGICAL PAIN

## 2018-01-11 ASSESSMENT — PAIN DESCRIPTION - ORIENTATION
ORIENTATION: LOWER

## 2018-01-11 ASSESSMENT — PAIN DESCRIPTION - PROGRESSION
CLINICAL_PROGRESSION: NOT CHANGED
CLINICAL_PROGRESSION: GRADUALLY IMPROVING

## 2018-01-11 ASSESSMENT — PAIN SCALES - GENERAL
PAINLEVEL_OUTOF10: 4
PAINLEVEL_OUTOF10: 5
PAINLEVEL_OUTOF10: 4
PAINLEVEL_OUTOF10: 5
PAINLEVEL_OUTOF10: 4
PAINLEVEL_OUTOF10: 4
PAINLEVEL_OUTOF10: 5
PAINLEVEL_OUTOF10: 0

## 2018-01-11 NOTE — CARE COORDINATION
1/11/18, 8:39 AM      Pedro [place]       Admitted from: Direct 1/10/2018/ [address]  day: 1   Location: [facility]04/004-A Reason for admit: Postoperative infection, initial encounter [T81. 4XXA] Status: IP  Admit order signed?: yes  PMH:  has a past medical history of Concussion; Depression; GERD (gastroesophageal reflux disease); Heartburn; Hyperlipidemia; Osteoarthritis; and Prolonged emergence from general anesthesia. Procedure: 1/10 LUMBAR WOUND I&D with Dr. Mini Piña  Pertinent abnormal Imaging:none  Medications:  Scheduled Meds:   FLUoxetine  40 mg Oral Nightly    gabapentin  600 mg Oral TID    sodium chloride flush  10 mL Intravenous 2 times per day    docusate sodium  100 mg Oral BID    ceFAZolin (ANCEF) IVPB  2 g Intravenous Q8H    pantoprazole  40 mg Oral QAM AC     Continuous Infusions:   sodium chloride 100 mL/hr at 01/11/18 0014      Pertinent Info/Orders/Treatment Plan:  Ortho following. ID consult. PT/OT. Wound care. Drain care. N/V checks. I/O. IVF. IV ancef. Pain control. Diet: DIET GENERAL;  Dietary Nutrition Supplements: Other Oral Supplement (see comment)   DVT Prophylaxis: SCD's ordered  Smoking status:  reports that he has never smoked.  He has never used smokeless tobacco.   Influenza Vaccination Screening Completed: yes  Pneumonia Vaccination Screening Completed: yes  Core measures: VTE  PCP: Simone Libman, MD  Readmission:   no  Risk Score: 2.5     Discharge Planning  Current Residence:  Private Residence  Living Arrangements:  Spouse/Significant Other, Children   Support Systems:  Spouse/Significant Other, Children, Friends/Neighbors, Family Members  Current Services PTA:     Potential Assistance Needed:  Home Care  Potential Assistance Purchasing Medications:  No  Does patient want to participate in local refill/ meds to beds program?  No  Type of Home Care Services:  None  Patient expects to be discharged to:  home  Expected Discharge date:  01/12/18  Follow Up Appointment: Best Day/ Time: Monday AM    Discharge Plan: Spoke with little, plans to return home with wife, has all needed DME at home from previous surgery. Patient does not anticipate discharge needs. He reports Dr. Kandi Villalba has not mentioned need for IV atbs as outpt. Cultures are pending, ID has been consulted. Will monitor. Update: Per Dr. Bui Payment note, plan to treat with PO antibiotics at discharge.       Evaluation: no

## 2018-01-11 NOTE — OP NOTE
[address] [place] [address]                                 OPERATIVE REPORT    PATIENT NAME: Molly Butler                 :        1970  MED REC NO:   [id number]                           ROOM:       0004  ACCOUNT NO:   [de-identified]                           ADMIT DATE: 01/10/2018  PROVIDER:     DOUGLAS Reynolds Dress OF PROCEDURE:  01/10/2018    PREOPERATIVE DIAGNOSES:  1. Lumbosacral spine wound infection. 2.  Status post lumbar spine revision, decompressive laminectomy and fusion      on 2017. POSTOPERATIVE DIAGNOSES:  1. Lumbosacral spine wound infection. 2.  Status post lumbar spine revision, decompressive laminectomy and fusion      on 2017. OPERATION PERFORMED:  Incision, irrigation, and debridement, which was                        excisional of the lumbar wound and this is superficial.    SURGEON:  Jessica Alvarado M.D.    ASSISTANT:  Mira Hui PA-C. ANESTHESIA:  General.    BLOOD LOSS:  10 mL. DRAIN:  Hemovac. COMPLICATIONS:  Zero. SPECIMEN:  Superficial lumbosacral spine wound tissue sent for  microbiologic culture, aerobic and anaerobic culturing. INDICATIONS:  The patient presents, 52years of age, having undergone  surgery on 2017. In the recent day, his wound became red and had  some light drainage. He notified our office. He was immediately brought  to the hospital this morning upon notification of this problem and he would  undergo an I and D immediately. The patient had had no fever, he was  feeling well, but the wound did express a small amount of yellow to  purulent fluid and therefore, brought him in for an I and D. DESCRIPTION:  We brought the patient to the operating room and upon entry,  time-out was observed. Anesthetic was delivered. Airway secured and Fry  catheter was not used.   He was then turned prone to a Randall frame table  and well

## 2018-01-11 NOTE — CONSULTS
[address] [place] [address]                                   CONSULTATION    PATIENT NAME: Alfred Love                 :        1970  MED REC NO:   [id number]                           ROOM:       0004  ACCOUNT NO:   [de-identified]                           ADMIT DATE: 01/10/2018  PROVIDER:     Babak Vallecillo. Sarwat Brand M.D.    Blade Echeverriaidus:  2018    REASON FOR CONSULTATION:  Superficial lumbar wound infection. HISTORY OF PRESENT ILLNESS:  He is a 40-year-old male patient who was  admitted to the hospital with drainage and dehiscence of lumbar wound. This patient had degenerative back surgery and had lumbar spine revision  with decompression, laminectomy and fusion on 2017. She noted open  wound and was taken to the OR yesterday. He had superficial lumbar spine  wound infection, had incision, irrigation and debridement, excisional and  was noted to be superficial.  The patient denies any fever or chills. He  had 5 operations on his lumbar area. He had laminectomy many years ago. Subsequently, he had fusion and had got loose due to many clinical reasons  requiring removal and had fusion on  and the last operation was  yesterday. The patient denied any purulent drainage from the wound. However, he noted that his hearing was diminished after the surgery. He  had seen ENT and he has been referred for outpatient evaluation. The  patient during his surgery required IV fluid and blood transfusion and  according to the patient, surgery took 3 hours. He did not get any  aminoglycosides, had no history of stroke. No family member with hearing  difficulty. PAST MEDICAL HISTORY:  Includes history of compression, depression, GERD,  heartburn, hyperlipidemia, osteoarthritis.     PAST SURGICAL HISTORY:  Includes colonoscopy, hardware removal from the  right and left, laminectomy, lumbar fusion, plantar fascial

## 2018-01-11 NOTE — CONSULTS
History & Physical        Patient:  Candice Bledsoe  YOB: 1970    MRN: [id number]   Acct:  [de-identified]   Primary Care Physician: Laly Jacobsen MD       Reason for consult:  Medical management/GERD    History of Present Illness:   History obtained from chart review and the patient. The patient is a 52 y.o. male who presented to [facility] with OA. S/P multiple back surgeries. Patient was admitted yesterday for I&D of surgical site. Pain is under good control with current regimen. GERD symptoms are controlled  + hearing loss since last surgery: worked up as outpatient: ? permanent per patient report      Past Medical History:        Diagnosis Date    Concussion 1990    Depression     GERD (gastroesophageal reflux disease)     Heartburn     Hyperlipidemia     Osteoarthritis     Prolonged emergence from general anesthesia        Past Surgical History:        Procedure Laterality Date    COLONOSCOPY  2014    HARDWARE REMOVAL  2013    Left side    HARDWARE REMOVAL  2013    Right Side    LAMINECTOMY      LUMBAR FUSION N/A 12/6/2017    LUMBAR LAMINECTOMY WITH PSF L3-S1 WITH PLIF L5-S1 WITH SOLERA 5.5, CAPSTONE ALVAREZ WITH DBM LOCAL BONE GRAFTING performed by Erika Hassan MD at [address]  2004    Both feet    [place] [address] [place] [address] N/A 6/40/5306    LUMBAR WOUND I&D performed by Erika Hassan MD at [address]  2012    L3-4 L4-5 [address]  2005       Home Medications:    Prior to Admission medications    Medication Sig Start Date End Date Taking? Authorizing Provider   oxyCODONE-acetaminophen (PERCOCET) 5-325 MG per tablet Take 1 tablet by mouth every 8 hours as needed for Pain.    Yes Historical Provider, MD   cyclobenzaprine (FLEXERIL) 10 MG tablet Take 10 mg by mouth nightly as needed for Muscle spasms   Yes Historical Provider, MD   FLUoxetine (PROZAC) 40 MG Diagnostic Testing:  Labs:     Recent Labs      01/11/18   0550   WBC  4.0*   HGB  10.6*   HCT  31.8*   PLT  189     Urinalysis:   No results found for: Amaryllis Nolan, BACTERIA, RBCUA, BLOODU, [address], [address] [place] [address]  Radiology:   Reviewed: see report for details  CXR: I have reviewed the report  EKG:  No acute changes:   XR CHEST PORTABLE   Final Result   Normal mobile chest.            **This report has been created using voice recognition software. It may contain minor errors which are inherent in voice recognition technology. **      Final report electronically signed by Dr. Da Funk on 1/10/2018 3:10 PM          Code Status: Full Code    Assessment/Plan:  Principal Problem:    Wound infection after surgery, initial encounter  Active Problems:    Postoperative infection    Postoperative anemia    Pain, postoperative, acute     continue abx per ID   Continue home meds  No indication for transfusion  Continue home meds    We see prn. Please call for any question.     PT/OT Eval Status: CM consult  DVT prophylaxis: [] Lovenox                                 [x] SCDs                                 [] SQ Heparin                                 [] Encourage ambulation           [] Already on Anticoagulation    Leoncio Briceno MD on 1/11/2018 at 7:48 AM  Admitting Hospitalist

## 2018-01-11 NOTE — H&P
time.    CURRENT MEDICATIONS:  Align; glucosamine and chondroitin; Prilosec; Prozac;  Zanaflex; Percocet; Tylenol; Neurontin; and he had stopped ibuprofen today,  he had 400 mg this morning, but he has stopped it since this morning. ALLERGIES:  PENICILLIN, which gives him rash and hives. REVIEW OF SYSTEMS:  He is alert and oriented x3 and currently in no acute  distress. He is a pleasant gentleman, well developed, well nourished and  cooperative. No chest pain. No abdominal discomfort and no loss of urine  or bladder control. PHYSICAL EXAMINATION:  VITAL SIGNS:  Temperature 97.8 Fahrenheit, pulse 96, respirations 16, blood  pressure 124/83, and pulse ox is 97% on room air. HEART:  Rate and rhythm are regular. LUNGS:  Clear to auscultation bilaterally. ABDOMEN:  Soft and nontender. EXTREMITIES:  He is sensitive to light touch bilaterally. Calves are soft  and nontender. There is no clinical sign of DVT in the lower extremities  bilaterally. There is no open wound in the lower extremity. Skin is  intact. No muscle atrophy except for in the previous incision site that  has a drainage that he will be undergoing surgery for today. He does  maintain a muscle strength of 5/5 with resisted knee extension, plantar  flexion, ankle dorsiflexion, great toe dorsiflexion bilaterally. DIAGNOSTIC DATA:  X-ray imaging, metallic pedicle screws are present from  L3 through S1. Devices are present at the level of L4-L5 and L5-S1 levels. The L4 pedicle screws has been repositioned and now it is in expected  location. ASSESSMENT:  Possible lumbar wound dissection, lumbar back drainage. CONCLUSION:  The patient will be undergoing incision and drainage today and  possible wound VAC placement. He has been cleared by Cardiology and  Medicine for him to undergo this incision and drainage and possible wound  VAC placement today.   Preoperative testing and clearance has been reviewed  from Cardiology and Medicine

## 2018-01-12 VITALS
HEIGHT: 69 IN | DIASTOLIC BLOOD PRESSURE: 77 MMHG | OXYGEN SATURATION: 94 % | HEART RATE: 100 BPM | TEMPERATURE: 98.8 F | RESPIRATION RATE: 16 BRPM | BODY MASS INDEX: 33.77 KG/M2 | SYSTOLIC BLOOD PRESSURE: 126 MMHG | WEIGHT: 228 LBS

## 2018-01-12 LAB
BASOPHILS # BLD: 0.3 %
BASOPHILS ABSOLUTE: 0 THOU/MM3 (ref 0–0.1)
EOSINOPHIL # BLD: 2.1 %
EOSINOPHILS ABSOLUTE: 0.1 THOU/MM3 (ref 0–0.4)
HCT VFR BLD CALC: 34 % (ref 42–52)
HEMOGLOBIN: 11.5 GM/DL (ref 14–18)
LYMPHOCYTES # BLD: 38.5 %
LYMPHOCYTES ABSOLUTE: 1.5 THOU/MM3 (ref 1–4.8)
MCH RBC QN AUTO: 30.4 PG (ref 27–31)
MCHC RBC AUTO-ENTMCNC: 33.8 GM/DL (ref 33–37)
MCV RBC AUTO: 89.9 FL (ref 80–94)
MONOCYTES # BLD: 11.9 %
MONOCYTES ABSOLUTE: 0.5 THOU/MM3 (ref 0.4–1.3)
NUCLEATED RED BLOOD CELLS: 0 /100 WBC
PDW BLD-RTO: 12.8 % (ref 11.5–14.5)
PLATELET # BLD: 218 THOU/MM3 (ref 130–400)
PMV BLD AUTO: 6.6 MCM (ref 7.4–10.4)
RBC # BLD: 3.79 MILL/MM3 (ref 4.7–6.1)
SEG NEUTROPHILS: 47.2 %
SEGMENTED NEUTROPHILS ABSOLUTE COUNT: 1.8 THOU/MM3 (ref 1.8–7.7)
WBC # BLD: 3.9 THOU/MM3 (ref 4.8–10.8)

## 2018-01-12 PROCEDURE — 85025 COMPLETE CBC W/AUTO DIFF WBC: CPT

## 2018-01-12 PROCEDURE — 2580000003 HC RX 258: Performed by: ORTHOPAEDIC SURGERY

## 2018-01-12 PROCEDURE — 6370000000 HC RX 637 (ALT 250 FOR IP): Performed by: OPHTHALMOLOGY

## 2018-01-12 PROCEDURE — 6370000000 HC RX 637 (ALT 250 FOR IP): Performed by: ORTHOPAEDIC SURGERY

## 2018-01-12 PROCEDURE — 36415 COLL VENOUS BLD VENIPUNCTURE: CPT

## 2018-01-12 PROCEDURE — 6360000002 HC RX W HCPCS: Performed by: INTERNAL MEDICINE

## 2018-01-12 RX ORDER — CEPHALEXIN 500 MG/1
500 CAPSULE ORAL 4 TIMES DAILY
Qty: 120 CAPSULE | Refills: 0 | Status: SHIPPED | OUTPATIENT
Start: 2018-01-12 | End: 2018-02-11

## 2018-01-12 RX ORDER — CYCLOBENZAPRINE HCL 10 MG
10 TABLET ORAL 3 TIMES DAILY PRN
Qty: 50 TABLET | Refills: 0 | Status: SHIPPED | OUTPATIENT
Start: 2018-01-12 | End: 2018-02-02

## 2018-01-12 RX ORDER — OXYCODONE HYDROCHLORIDE AND ACETAMINOPHEN 5; 325 MG/1; MG/1
1 TABLET ORAL EVERY 6 HOURS PRN
Qty: 50 TABLET | Refills: 0 | Status: SHIPPED | OUTPATIENT
Start: 2018-01-12 | End: 2018-02-02

## 2018-01-12 RX ADMIN — OXYCODONE HYDROCHLORIDE AND ACETAMINOPHEN 1 TABLET: 5; 325 TABLET ORAL at 00:50

## 2018-01-12 RX ADMIN — PANTOPRAZOLE SODIUM 40 MG: 40 TABLET, DELAYED RELEASE ORAL at 05:51

## 2018-01-12 RX ADMIN — OXYCODONE HYDROCHLORIDE AND ACETAMINOPHEN 1 TABLET: 5; 325 TABLET ORAL at 12:27

## 2018-01-12 RX ADMIN — OXYCODONE HYDROCHLORIDE AND ACETAMINOPHEN 1 TABLET: 5; 325 TABLET ORAL at 07:44

## 2018-01-12 RX ADMIN — CEFAZOLIN SODIUM 1 G: 1 INJECTION, SOLUTION INTRAVENOUS at 07:44

## 2018-01-12 RX ADMIN — DOCUSATE SODIUM 100 MG: 100 CAPSULE ORAL at 07:46

## 2018-01-12 RX ADMIN — GABAPENTIN 600 MG: 300 CAPSULE ORAL at 07:45

## 2018-01-12 RX ADMIN — CEFAZOLIN SODIUM 1 G: 1 INJECTION, SOLUTION INTRAVENOUS at 00:50

## 2018-01-12 RX ADMIN — Medication 10 ML: at 07:45

## 2018-01-12 RX ADMIN — GUAIFENESIN 600 MG: 600 TABLET, EXTENDED RELEASE ORAL at 07:44

## 2018-01-12 ASSESSMENT — PAIN DESCRIPTION - ORIENTATION
ORIENTATION: LOWER
ORIENTATION: LOWER

## 2018-01-12 ASSESSMENT — PAIN SCALES - GENERAL
PAINLEVEL_OUTOF10: 4
PAINLEVEL_OUTOF10: 2
PAINLEVEL_OUTOF10: 5

## 2018-01-12 ASSESSMENT — PAIN DESCRIPTION - LOCATION
LOCATION: BACK
LOCATION: BACK

## 2018-01-12 ASSESSMENT — PAIN DESCRIPTION - FREQUENCY
FREQUENCY: CONTINUOUS
FREQUENCY: CONTINUOUS

## 2018-01-12 ASSESSMENT — PAIN DESCRIPTION - DESCRIPTORS
DESCRIPTORS: BURNING
DESCRIPTORS: BURNING

## 2018-01-12 ASSESSMENT — PAIN DESCRIPTION - ONSET
ONSET: ON-GOING
ONSET: ON-GOING

## 2018-01-12 ASSESSMENT — PAIN DESCRIPTION - PROGRESSION
CLINICAL_PROGRESSION: NOT CHANGED
CLINICAL_PROGRESSION: GRADUALLY IMPROVING

## 2018-01-12 ASSESSMENT — PAIN DESCRIPTION - PAIN TYPE
TYPE: SURGICAL PAIN
TYPE: SURGICAL PAIN

## 2018-01-12 NOTE — PROGRESS NOTES
Department of Orthopedic Surgery  Spine Service  Attending Progress Note        Subjective:  Patient was resting when spoke to him this morning; did have a good night sleep; would like to go home if possible today; he is flatulence and have been having bowel movement; being up and ambulating and overall doing fine today. Vitals  VITALS:  /77   Pulse 100   Temp 98.8 °F (37.1 °C) (Oral)   Resp 16   Ht 5' 9\" (1.753 m)   Wt 228 lb (103.4 kg)   SpO2 94%   BMI 33.67 kg/m²   24HR INTAKE/OUTPUT:    Intake/Output Summary (Last 24 hours) at 01/12/18 9484  Last data filed at 01/12/18 0548   Gross per 24 hour   Intake          3620.72 ml   Output              445 ml   Net          3175.72 ml     URINARY CATHETER OUTPUT (Fry):     DRAIN/TUBE OUTPUT:  Closed/Suction Drain Right;Posterior Back Accordion-Output (ml): 5 ml      PHYSICAL EXAM:    Orientation:  alert and oriented to person, place and time    Incision:  dressing in place, clean, dry, intact    Lower Extremity Motor :  quadriceps, extensor hallucis longus, dorsiflexion, plantarflexion 5/5 bilaterally  Lower Extremity Sensory:  Intact L1-S1    Flatus:  positive    ABNORMAL EXAM FINDINGS:  none    LABS:    HgB:    Lab Results   Component Value Date    HGB 11.5 01/12/2018       ASSESSMENT AND PLAN:    Post operative day 2    1:  Monitor labs and drain output and vital signs  2:  Activity Level:  OOB as tolerated  3:  Pain Control:  No change  4:  Discharge Planning:  Home when cleared by medicine to do so; [address]; culture is positive for Staphylococcus aureus and should be receiving treatment for it; continue monitoring patient's progress.
Department of Orthopedic Surgery  Spine Service  Carleen Phoenix, PA-C Progress Note        Subjective:  Pt lying in bed feeling well. No complaints of pain. No fever/chills.     Vitals  VITALS:  BP (!) 109/58   Pulse 112   Temp 99.2 °F (37.3 °C) (Oral)   Resp 16   Ht 5' 9\" (1.753 m)   Wt 228 lb (103.4 kg)   SpO2 93%   BMI 33.67 kg/m²   24HR INTAKE/OUTPUT:    Intake/Output Summary (Last 24 hours) at 01/11/18 0654  Last data filed at 01/11/18 0551   Gross per 24 hour   Intake           3634.5 ml   Output               10 ml   Net           3624.5 ml     URINARY CATHETER OUTPUT (Fry):     DRAIN/TUBE OUTPUT:  Closed/Suction Drain Right;Posterior Back Accordion-Output (ml): 0 ml (too small to measure)      PHYSICAL EXAM:    Orientation:  alert and oriented to person, place and time    Incision:  dressing in place, clean, dry, intact    Lower Extremity Motor :  quadriceps, extensor hallucis longus, dorsiflexion, plantarflexion 5/5 bilaterally  Lower Extremity Sensory:  Intact L1-S1    Flatus:  positive    ABNORMAL EXAM FINDINGS:  none    LABS:    HgB:    Lab Results   Component Value Date    HGB 10.0 12/10/2017         ASSESSMENT AND PLAN:    Post operative day 1     1:  Monitor labs and drain output  2:  Activity Level:  OOB as tolerated  3:  Pain Control:  Controlled with medication  4:  Discharge Planning: Waiting on cultures and ID consult for lumbar wound infection
Patient arrived to floor from recovery. Patient on 2L NC.  0.9 infusing in left forearm @100ml with 700ml left in bag. Patient alert and oriented to room. See assessment.
in place. Nurse calling 7K to check on room status. Per Pardeep Frey RN, room almost ready, will call PACU for report once room clean. 1815-Pt continues to rest easy, VS stable. Pt eating ice chips, tolerating well. 1823-Brittany RN from [employer] calling for report. Report given, no questions or concerns voiced. Transport notified of need for transfer to [address] [place] [address]. Waiting room called and family notified pt going to room. 1838-Pt leaving with transport team at this time, remains in stable condition. Chart on bed.

## 2018-01-12 NOTE — PLAN OF CARE
Problem: SAFETY  Goal: Free from accidental physical injury  Outcome: Met This Shift  Pt has not experienced any injuries this shift. Pt bed in lowest position and bed wheels are locked. Pt items and call light are within reach. Goal: Free from intentional harm  Outcome: Met This Shift  Pt denies any intentional harm.     Problem: PAIN  Goal: Patient's pain/discomfort is manageable  Outcome: Ongoing  Pt rating lower back pain as 0-4/10. Pt tolerating PO pain medication. Pt has not had any pain medication yet this shift and is able to get periods of rest.      Problem: SKIN INTEGRITY  Goal: Skin integrity is maintained or improved  Outcome: Met This Shift  Pt has no evidence of skin breakdown. Pt is able to reposition self in bed and pt understands importance of repositioning to prevent skin breakdown. Pt does have a surgical incision to his back with a dry dressing in place that is clean, dry and intact. Will continue to monitor.      Problem: DISCHARGE BARRIERS  Goal: Patient's continuum of care needs are met  Outcome: Ongoing  Pt planning to go home at discharge. Discharge planning in progress.      Problem: Neurological  Goal: Maximum potential motor/sensory/cognitive function  Outcome: Met This Shift  Pt pedal push and pull is strong and equal bilaterally. Pt denies numbness or tingling. Pt is alert and oriented x4.      Problem: Cardiovascular  Goal: No DVT, peripheral vascular complications  Outcome: Met This Shift  Pt has no evidence of DVT at this time. Pt is compliant with the use of SCDs bilaterally to prevent blood clots.      Problem: Respiratory  Goal: No pulmonary complications  Outcome: Met This Shift  Pt O2 sats >90% on room air. Pt lung sounds are clear bilaterally and pt denies SOB.      Problem: GI  Goal: No bowel complications  Outcome: Met This Shift  Pt bowel sounds are active and pt is passing gas. Pt had a bowel movement today.     Problem:   Goal: Adequate urinary output  Outcome:  Met
Problem: SAFETY  Goal: Free from accidental physical injury  Outcome: Ongoing  Patient is steady on his feet preop- aware he will be a fall risk post op. Goal: Free from intentional harm  Outcome: Ongoing  Steady on his feet, no need for assistive devices, agreeable to fall precautions discussed post op. Problem: PAIN  Goal: Patient's pain/discomfort is manageable  Outcome: Ongoing  Patient complains of back pain around surgical site, prn medication controls this pain. Problem: SKIN INTEGRITY  Goal: Skin integrity is maintained or improved  Outcome: Ongoing  Incision on back is draining from the mid portion- surgery is planned today to clean out and possible wound vac. Problem: DISCHARGE BARRIERS  Goal: Patient's continuum of care needs are met  Outcome: Ongoing  Patient aware discharge plans depend on if a drain is placed, how extensive surgery is. Comments: Care plan reviewed with patient and wife. Patient and wife verbalize understanding of the plan of care and contribute to goal setting.
  Goal: Adequate urinary output  Outcome: Met This Shift  Pt is putting out adequate amounts of clear/yellow urine via bathroom. Pt is on IV fluids and encouraging pt to drink plenty of water. Problem: Nutrition  Goal: Optimal nutrition therapy  Outcome: Met This Shift  Pt is to advance diet as tolerated to general diet. Pt denies any nausea or vomiting. Problem: Falls - Risk of:  Goal: Will remain free from falls  Will remain free from falls  Outcome: Met This Shift  Pt has not fallen this shift. Pt is up with assistance and does not use a walker or cane. Pt items are within reach and pt uses call light appropriately for assistance. Comments: Care plan reviewed with patient. Patient verbalizes understanding of the plan of care and contribute to goal setting.

## 2018-01-13 NOTE — DISCHARGE SUMMARY
of antibiotic he will going home with. We  will see the patient in the office after discharge.         Sanjay Mireles PA-C    D: 01/12/2018 10:01:43       T: 01/12/2018 11:47:47     GARY/JOSE [email or web address]  Job#: [id number]     Doc#: [id number]    CC:

## 2018-01-15 LAB
AEROBIC CULTURE: ABNORMAL
ANAEROBIC CULTURE: ABNORMAL
GRAM STAIN RESULT: ABNORMAL
ORGANISM: ABNORMAL

## 2023-03-21 NOTE — PROGRESS NOTES
hallucinations. Driving History  Jorge Canchola does not report sleepiness while driving. There have not been driving accidents or near-misses related to sleepiness, fatigue or inattention. Weight Issues  There has not been a change in his weight over the past year , has had 10 lb wt gain over past 5 years. Caffeine Intake  Jorge Canchola does drink caffeine,  5  HOUR ENERGY DRINKS 1-2  per day, and sometimes 1 monster drink . 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 The work hours are generally 6: 30 am - 4: 30 pm  and there has not been any recent changes in the shifts or hours. Family Sleep History  There are not family members with a history of snoring, no one has ever been tested for sleep apnea . Neck Size: 17  Mallampati Mallampati 1  ESS:  16  SAQLI: 35    Sleep Hx     Sleep symptoms:  Yes No  Additional Comments   Snoring [x] []    Witness Apneas [] [x]        Waking snorting/gasping [x] []     Nocturia []    [x]     Legs kicking at night [] [x]     AM Headaches [] [x]     Non-restorative sleep [x] []     Daytime sleepiness/fatigue [x] []     Daytime napping [] [x]     Drowsiness while driving [] [x]     Memory issues [] [x]     Decreased concentration [x] []     Cataplexy [] [x]     Hypnogogic hallucinations [] [x]     Sleep paralysis [] [x]     Other [] [x]        Symptoms began about 10 years.       Previous evaluation and treatment has included-PSG with C PAP titration      DOT/CDL - no  FAA/'s license - no     Previous Report(s) Reviewed: historical medical records, office notes and referral letter/letters      Download   3/22/2023   PAP Download:   Compliant  97%     Noncompliant 3 %     PAP Type cpap Level  7   Avg Hrs/Day 7 hours/ 52 min   AHI: 13.3   Recorded compliance dates , 02/13/2023  to 03/14/2023     Machine/Mfg:   [x] ResMed    [] Respironics/Dreamstation     Maximum leaks 18.4 L/min     Past Medical hx

## 2023-03-22 ENCOUNTER — OFFICE VISIT (OUTPATIENT)
Dept: PULMONOLOGY | Age: 53
End: 2023-03-22
Payer: COMMERCIAL

## 2023-03-22 VITALS
BODY MASS INDEX: 33.47 KG/M2 | TEMPERATURE: 97.9 F | DIASTOLIC BLOOD PRESSURE: 76 MMHG | WEIGHT: 226 LBS | SYSTOLIC BLOOD PRESSURE: 124 MMHG | HEIGHT: 69 IN | HEART RATE: 96 BPM | OXYGEN SATURATION: 97 %

## 2023-03-22 DIAGNOSIS — F32.A ANXIETY AND DEPRESSION: ICD-10-CM

## 2023-03-22 DIAGNOSIS — G47.33 OSA ON CPAP: Primary | ICD-10-CM

## 2023-03-22 DIAGNOSIS — K21.9 GASTROESOPHAGEAL REFLUX DISEASE, UNSPECIFIED WHETHER ESOPHAGITIS PRESENT: ICD-10-CM

## 2023-03-22 DIAGNOSIS — E66.9 OBESITY (BMI 30-39.9): ICD-10-CM

## 2023-03-22 DIAGNOSIS — F41.9 ANXIETY AND DEPRESSION: ICD-10-CM

## 2023-03-22 DIAGNOSIS — G47.10 HYPERSOMNIA: ICD-10-CM

## 2023-03-22 DIAGNOSIS — Z99.89 OSA ON CPAP: Primary | ICD-10-CM

## 2023-03-22 PROBLEM — R13.14 PHARYNGOESOPHAGEAL DYSPHAGIA: Status: ACTIVE | Noted: 2018-05-22

## 2023-03-22 PROCEDURE — 99204 OFFICE O/P NEW MOD 45 MIN: CPT | Performed by: NURSE PRACTITIONER

## 2023-03-22 RX ORDER — FENTANYL 12 UG/H
1 PATCH TRANSDERMAL
COMMUNITY

## 2023-03-22 RX ORDER — OXYCODONE HYDROCHLORIDE AND ACETAMINOPHEN 325; 5 MG/5ML; MG/5ML
SOLUTION ORAL EVERY 4 HOURS PRN
COMMUNITY

## 2023-03-22 RX ORDER — ROSUVASTATIN CALCIUM 10 MG/1
10 TABLET, COATED ORAL DAILY
COMMUNITY

## 2023-03-22 ASSESSMENT — ENCOUNTER SYMPTOMS
DIARRHEA: 0
ABDOMINAL PAIN: 0
WHEEZING: 0
COUGH: 0
SHORTNESS OF BREATH: 0
NAUSEA: 0
BACK PAIN: 1
EYES NEGATIVE: 1
APNEA: 1
VOMITING: 0

## 2023-08-08 NOTE — PROGRESS NOTES
months with download on new machine     Information added by my medical assistant/LPN was reviewed today    billing based on medical decision making     LORRI Calderon-ALVARO   8/9/2023

## 2023-08-09 ENCOUNTER — OFFICE VISIT (OUTPATIENT)
Dept: PULMONOLOGY | Age: 53
End: 2023-08-09
Payer: COMMERCIAL

## 2023-08-09 VITALS
WEIGHT: 226 LBS | TEMPERATURE: 98.5 F | SYSTOLIC BLOOD PRESSURE: 114 MMHG | BODY MASS INDEX: 33.47 KG/M2 | DIASTOLIC BLOOD PRESSURE: 74 MMHG | HEART RATE: 84 BPM | OXYGEN SATURATION: 98 % | HEIGHT: 69 IN

## 2023-08-09 DIAGNOSIS — G47.33 OSA ON CPAP: Primary | ICD-10-CM

## 2023-08-09 DIAGNOSIS — G89.4 CHRONIC PAIN SYNDROME: ICD-10-CM

## 2023-08-09 DIAGNOSIS — F32.A ANXIETY AND DEPRESSION: ICD-10-CM

## 2023-08-09 DIAGNOSIS — F41.9 ANXIETY AND DEPRESSION: ICD-10-CM

## 2023-08-09 DIAGNOSIS — Z99.89 OSA ON CPAP: Primary | ICD-10-CM

## 2023-08-09 DIAGNOSIS — E66.9 OBESITY (BMI 30-39.9): ICD-10-CM

## 2023-08-09 DIAGNOSIS — G47.10 HYPERSOMNIA: ICD-10-CM

## 2023-08-09 PROCEDURE — 99214 OFFICE O/P EST MOD 30 MIN: CPT | Performed by: NURSE PRACTITIONER

## 2023-08-09 RX ORDER — TIZANIDINE 4 MG/1
TABLET ORAL
COMMUNITY
Start: 2023-05-30

## 2023-08-09 ASSESSMENT — ENCOUNTER SYMPTOMS
DIARRHEA: 0
ABDOMINAL PAIN: 0
WHEEZING: 0
BACK PAIN: 1
SHORTNESS OF BREATH: 0
NAUSEA: 0
EYES NEGATIVE: 1
VOMITING: 0
COUGH: 0

## 2023-11-10 ENCOUNTER — TELEPHONE (OUTPATIENT)
Dept: PULMONOLOGY | Age: 53
End: 2023-11-10

## 2023-12-06 NOTE — PROGRESS NOTES
and discussed with patient  - He  was advised to continue current positive airway pressure therapy with above described pressure. - He  advised to keep good compliance with current recommended pressure to get optimal results and clinical improvement  - Recommend 7-9 hours of sleep with PAP  - He was advised to call [facility] regarding supplies if needed.   -He call my office for earlier appointment if needed for worsening of sleep symptoms.   - He was instructed on weight loss  -stable hypersomnia 2/2 S/E pain meds and effects of chronic pain - working with pain management     - Jerry Holland was educated about my impression and plan. Patient verbalizesunderstanding.   We will see Jamil Render back in: 1 year with download    Information added by my medical assistant/LPN was reviewed today       billing based on medical decision making     LORRI Healy-ALVARO   12/13/2023

## 2023-12-13 ENCOUNTER — OFFICE VISIT (OUTPATIENT)
Dept: PULMONOLOGY | Age: 53
End: 2023-12-13
Payer: COMMERCIAL

## 2023-12-13 VITALS
WEIGHT: 233.2 LBS | HEIGHT: 69 IN | BODY MASS INDEX: 34.54 KG/M2 | SYSTOLIC BLOOD PRESSURE: 110 MMHG | DIASTOLIC BLOOD PRESSURE: 62 MMHG | HEART RATE: 73 BPM | TEMPERATURE: 97.9 F | OXYGEN SATURATION: 97 %

## 2023-12-13 DIAGNOSIS — G89.4 CHRONIC PAIN SYNDROME: ICD-10-CM

## 2023-12-13 DIAGNOSIS — G47.10 HYPERSOMNIA: ICD-10-CM

## 2023-12-13 DIAGNOSIS — G47.33 OSA ON CPAP: Primary | ICD-10-CM

## 2023-12-13 PROCEDURE — 99213 OFFICE O/P EST LOW 20 MIN: CPT | Performed by: NURSE PRACTITIONER

## 2023-12-13 ASSESSMENT — ENCOUNTER SYMPTOMS
SHORTNESS OF BREATH: 0
COUGH: 0
ABDOMINAL PAIN: 0
NAUSEA: 0
DIARRHEA: 0
BACK PAIN: 1
VOMITING: 0
WHEEZING: 0
EYES NEGATIVE: 1

## 2024-12-11 ENCOUNTER — OFFICE VISIT (OUTPATIENT)
Dept: PULMONOLOGY | Age: 54
End: 2024-12-11
Payer: COMMERCIAL

## 2024-12-11 VITALS
BODY MASS INDEX: 33.27 KG/M2 | HEART RATE: 94 BPM | HEIGHT: 69 IN | TEMPERATURE: 97.8 F | OXYGEN SATURATION: 96 % | SYSTOLIC BLOOD PRESSURE: 134 MMHG | WEIGHT: 224.6 LBS | DIASTOLIC BLOOD PRESSURE: 86 MMHG

## 2024-12-11 DIAGNOSIS — Z78.9 DIFFICULTY WITH CPAP USE: ICD-10-CM

## 2024-12-11 DIAGNOSIS — G47.33 OSA ON CPAP: Primary | ICD-10-CM

## 2024-12-11 DIAGNOSIS — E66.9 OBESITY (BMI 30-39.9): ICD-10-CM

## 2024-12-11 PROCEDURE — 99214 OFFICE O/P EST MOD 30 MIN: CPT | Performed by: NURSE PRACTITIONER

## 2024-12-11 ASSESSMENT — ENCOUNTER SYMPTOMS: BACK PAIN: 1

## 2024-12-11 NOTE — PROGRESS NOTES
DME Order for CPAP as OP    Difficulty with CPAP use- persistent   Feels benefit with PAP use but continues to struggle nightly with uncomfortable masks.   -     Home Sleep Study; to re-assess AHI and see if qualifies for inspire   -BMI 33, discussed need for continued weight loss to get BMI < 32 per [place] insurance guidelines for inspire     Obesity (BMI 30-39.9)- improving  Pt counseled on obesity, health risks associated with obesity and counseled on need for weight reduction.       Patient verbalizes understanding.  We will see Rj Grewal back after HST to review results and discuss inspire implant further     Information added by my medical assistant/LPN was reviewed today    billing based on medical decision making     LORRI Flowers-CNP   12/11/2024

## 2025-01-22 ENCOUNTER — OFFICE VISIT (OUTPATIENT)
Dept: PULMONOLOGY | Age: 55
End: 2025-01-22
Payer: COMMERCIAL

## 2025-01-22 VITALS
TEMPERATURE: 98.1 F | BODY MASS INDEX: 32.07 KG/M2 | DIASTOLIC BLOOD PRESSURE: 80 MMHG | HEIGHT: 70 IN | SYSTOLIC BLOOD PRESSURE: 122 MMHG | HEART RATE: 111 BPM | OXYGEN SATURATION: 97 % | WEIGHT: 224 LBS

## 2025-01-22 DIAGNOSIS — Z78.9 DIFFICULTY WITH CPAP USE: ICD-10-CM

## 2025-01-22 DIAGNOSIS — G47.33 OSA (OBSTRUCTIVE SLEEP APNEA): Primary | ICD-10-CM

## 2025-01-22 PROCEDURE — 99214 OFFICE O/P EST MOD 30 MIN: CPT | Performed by: NURSE PRACTITIONER

## 2025-01-22 ASSESSMENT — ENCOUNTER SYMPTOMS: BACK PAIN: 1

## 2025-01-22 NOTE — PROGRESS NOTES
[facility]      Rj Grewal         [id number]  1/22/2025   Chief Complaint   Patient presents with    Follow-up     1 month JAGDISH follow up with Zoila allen, HST 12/26/24 to discuss Inspire.         Patient of Lidia Nazario CNP    PAP Download:   Original or initial AHI: 22.7     Date of initial study: 4/23/15      HST 12/26/2024  AHI 15.5       Compliant  83%     Noncompliant 7%     PAP Type CPAP Level  9cmH20   Avg Hrs/Day 7 hours 42 minutes  AHI: 4.5   Leaks : 95 th percentile: 11.8   Recorded compliance dates 12/22/24-1/20/25   Machine/Mfg:   [x] ResMed    [] Respironics/Dreamstation   Interface:   [] Nasal    [x] Nasal pillows   [] FFM      Provider:      [] -[facility]     [x]Zoila     [] Jorge    [] [employer]    [] [facility]               [] [facility]      [] Adaptive    [] [place]:      [] Other    Neck Size: 16.25 inches  Mallampati 1  ESS:  12  SAQLI: 50    Here is a scan of the most recent download:                        Presentation:   Rj presents for 1 yearsleep medicine follow up for obstructive sleep apnea  Since the last visit, Rj completed HST to see if qualifies for Inspire  Using his CPAP and gets benefit with use but struggles with discomfort and \" annoyance\" with mask   Has tried several masks and feels like can not get a good fit.   Has been working on weight loss, and has gotten his BMI down to 32       Progress History:   Since last visit any new medical issues? No  Any trouble with Machine No  Any new sleep medicines? no    Review of Systems -   Review of Systems   Musculoskeletal:  Positive for arthralgias and back pain.   Psychiatric/Behavioral:  Positive for sleep disturbance.    All other systems reviewed and are negative.       Physical Exam:    BMI:  Body mass index is 32.14 kg/m².    Wt Readings from Last 3 Encounters:   01/22/25 101.6 kg (224 lb)   12/11/24 101.9 kg (224 lb 9.6 oz)   12/13/23 105.8 kg (233 lb 3.2 oz)

## 2025-01-22 NOTE — PATIENT INSTRUCTIONS
Dr Julio, [facility] - sent referral to him    Can look into Dr Anoop Andersen - [facility]    or Dr Louis Mosquera- [facility]

## 2025-07-21 ENCOUNTER — OFFICE VISIT (OUTPATIENT)
Age: 55
End: 2025-07-21
Payer: COMMERCIAL

## 2025-07-21 VITALS
BODY MASS INDEX: 31.55 KG/M2 | TEMPERATURE: 98 F | DIASTOLIC BLOOD PRESSURE: 72 MMHG | HEIGHT: 69 IN | SYSTOLIC BLOOD PRESSURE: 124 MMHG | WEIGHT: 213 LBS | OXYGEN SATURATION: 96 % | HEART RATE: 87 BPM

## 2025-07-21 DIAGNOSIS — Z45.42 ENCOUNTER FOR ADJUSTMENT AND MANAGEMENT OF NEUROSTIMULATOR: Primary | ICD-10-CM

## 2025-07-21 DIAGNOSIS — Z96.82 S/P INSERTION OF HYPOGLOSSAL NERVE STIMULATOR: ICD-10-CM

## 2025-07-21 DIAGNOSIS — Z78.9 INTOLERANCE OF CONTINUOUS POSITIVE AIRWAY PRESSURE (CPAP) VENTILATION: ICD-10-CM

## 2025-07-21 DIAGNOSIS — G47.33 OSA (OBSTRUCTIVE SLEEP APNEA): ICD-10-CM

## 2025-07-21 PROBLEM — E66.811 OBESITY (BMI 30.0-34.9): Status: ACTIVE | Noted: 2025-06-05

## 2025-07-21 PROCEDURE — 95977 ALYS CPLX CN NPGT PRGRMG: CPT | Performed by: NURSE PRACTITIONER

## 2025-07-21 PROCEDURE — 99213 OFFICE O/P EST LOW 20 MIN: CPT | Performed by: NURSE PRACTITIONER

## 2025-07-21 RX ORDER — DULOXETIN HYDROCHLORIDE 60 MG/1
60 CAPSULE, DELAYED RELEASE ORAL DAILY
COMMUNITY
Start: 2025-07-16

## 2025-07-21 NOTE — PROGRESS NOTES
[facility]      Rj Grewal         [id number]  7/21/2025   Chief Complaint   Patient presents with    Follow-up     Inspire activation implanted 6.10.25 with Dr. Bhatia            Original or initial AHI: 22.7     Date of initial study: 4/23/15    Original or initial AHI: 15.5     Date of initial study: 12/26/2024      ENT surgeon Dr Bhatia   Pre-Implant ESS 12 ( 7/21/2025)     Date of Inspire Implant 6/10/2025      Neck Size: 16.25  Mallampati Mallampati 1  ESS:  12  SAQLI: 57      Presentation:   Rj presents for sleep medicine follow up for Inspire follow up, to be evaluated for activation   History of Present Illness  The patient presents for a post-surgical follow-up.    He reports a satisfactory recovery from the surgery, with most functions returning to normal. However, he notes some residual soreness at one of the incision sites. His speech has fully recovered, and he is not experiencing any difficulty swallowing. He mentions that his tongue mobility is slightly limited in one direction compared to the other.  He typically falls asleep within 10 to 15 minutes and can return to sleep within 15 minutes if he wakes up during the night. On weekends, he occasionally sleeps for up to 10 hours, depending on his level of fatigue or activity. His average sleep duration is between 8 to 9 hours.        Physical Exam:    BMI:  Body mass index is 31.45 kg/m².    Wt Readings from Last 3 Encounters:   07/21/25 96.6 kg (213 lb)   01/22/25 101.6 kg (224 lb)   12/11/24 101.9 kg (224 lb 9.6 oz)     Weight lost 10 lbs over 6 months   Vitals: /72 (BP Site: Left Upper Arm, Patient Position: Sitting, BP Cuff Size: Medium Adult)   Pulse 87   Temp 98 °F (36.7 °C) (Infrared)   Ht 1.753 m (5' 9\")   Wt 96.6 kg (213 lb)   SpO2 96% Comment: on ra  BMI 31.45 kg/m²           Physical Exam  Vitals and nursing note reviewed.   Constitutional:       Appearance: Normal

## 2025-07-21 NOTE — PATIENT INSTRUCTIONS
Increase your level by 1 level every week as able. If it becomes uncomfortable , turn it back down one level.    The levels are seen on the back of your remote.   You are going home on level 2.     Do not increase more often than weekly even if you do not feel any difference , do not increase before one week.   Sometimes it can take time for you to feel it.   BUT if you feel uncomfortable you may turn it back down sooner if needed.     Educated on implant card, may be needed for flights and if MRI needed .

## 2025-08-18 ENCOUNTER — OFFICE VISIT (OUTPATIENT)
Age: 55
End: 2025-08-18
Payer: COMMERCIAL

## 2025-08-18 VITALS
HEART RATE: 89 BPM | BODY MASS INDEX: 30.56 KG/M2 | SYSTOLIC BLOOD PRESSURE: 116 MMHG | OXYGEN SATURATION: 97 % | HEIGHT: 70 IN | DIASTOLIC BLOOD PRESSURE: 66 MMHG | TEMPERATURE: 98 F | WEIGHT: 213.5 LBS

## 2025-08-18 DIAGNOSIS — Z45.42 ENCOUNTER FOR ADJUSTMENT AND MANAGEMENT OF NEUROSTIMULATOR: Primary | ICD-10-CM

## 2025-08-18 DIAGNOSIS — G47.33 OSA (OBSTRUCTIVE SLEEP APNEA): ICD-10-CM

## 2025-08-18 DIAGNOSIS — Z96.82 S/P INSERTION OF HYPOGLOSSAL NERVE STIMULATOR: ICD-10-CM

## 2025-08-18 PROCEDURE — 99213 OFFICE O/P EST LOW 20 MIN: CPT | Performed by: NURSE PRACTITIONER

## 2025-08-18 PROCEDURE — 95977 ALYS CPLX CN NPGT PRGRMG: CPT | Performed by: NURSE PRACTITIONER

## (undated) DEVICE — KAIRISON TUBING SET PNEUMATIC, (3000 MM), STERILE, DISPOSABLE, TO BE USED WITH: FK898R, PACKAGE OF 10 PIECES: Brand: KAIRISON

## (undated) DEVICE — GLOVE SURG SZ 65 THK91MIL LTX FREE SYN POLYISOPRENE

## (undated) DEVICE — 4-PORT MANIFOLD: Brand: NEPTUNE 2

## (undated) DEVICE — 1010 S-DRAPE TOWEL DRAPE 10/BX: Brand: STERI-DRAPE™

## (undated) DEVICE — THE MILL DISPOSABLE - MEDIUM

## (undated) DEVICE — GAUZE,SPONGE,8"X4",12PLY,XRAY,STRL,LF: Brand: MEDLINE

## (undated) DEVICE — KIT POS TBL W O HDRST JACK

## (undated) DEVICE — TRAY PREP DRY W/ PREM GLV 2 APPL 6 SPNG 2 UNDPD 1 OVERWRAP

## (undated) DEVICE — CODMAN® SURGICAL PATTIES 1" X 1" (2.54CM X 2.54CM): Brand: CODMAN®

## (undated) DEVICE — PACK,UNIVERSAL,NO GOWNS: Brand: MEDLINE

## (undated) DEVICE — SET DRN PVC DBL RND W/ TRCR 1/8 IN MID PERF 12 H PAT

## (undated) DEVICE — 500ML,PRESSURE INFUSER W/STOPCOCK: Brand: MEDLINE

## (undated) DEVICE — SOLUTION IV 1000ML 0.9% SOD CHL PH 5 INJ USP VIAFLX PLAS

## (undated) DEVICE — MEDI-VAC NON-CONDUCTIVE SUCTION TUBING 6MM X 6.1M (20 FT.) L: Brand: CARDINAL HEALTH

## (undated) DEVICE — EVACUATOR SURG 400CC PVC 3 SPR BLB FOR WND DRNGE

## (undated) DEVICE — GLOVE ORANGE PI 8   MSG9080

## (undated) DEVICE — STRIP,CLOSURE,WOUND,MEDI-STRIP,1/2X4: Brand: MEDLINE

## (undated) DEVICE — GOWN,SIRUS,NONRNF,SETINSLV,XL,20/CS: Brand: MEDLINE

## (undated) DEVICE — GLOVE ORANGE PI 7   MSG9070

## (undated) DEVICE — CAUTERY TIP POLISHER: Brand: DEVON

## (undated) DEVICE — 3M™ STERI-STRIP™ BLEND TONE SKIN CLOSURES, B1557, TAN, 1/2 IN X 4 IN (12MM X 100MM), 6 STRIPS/ENVELOPE: Brand: 3M™ STERI-STRIP™

## (undated) DEVICE — ULTRACLEAN ACCESSORY ELECTRODE 6" (15.24 CM) COATED BLADE: Brand: ULTRACLEAN

## (undated) DEVICE — 3M™ MICROFOAM™ TAPE 1528-4: Brand: 3M™ MICROFOAM™

## (undated) DEVICE — 3M™ BAIR HUGGER® MULTI ACCESS BLANKET, PEDIATRIC, FULL BODY, 10 PER CASE 31000: Brand: BAIR HUGGER™

## (undated) DEVICE — PACK PROCEDURE SURG SET UP SRMC

## (undated) DEVICE — 3M™ STERI-STRIP™ COMPOUND BENZOIN TINCTURE 40 BAGS/CARTON 4 CARTONS/CASE C1544: Brand: 3M™ STERI-STRIP™

## (undated) DEVICE — PROBE STIM 3 MM FOR PEDCL SCREW DISP

## (undated) DEVICE — PRESSURE MONITORING SET: Brand: TRUWAVE

## (undated) DEVICE — 3M™ IOBAN™ 2 ANTIMICROBIAL INCISE DRAPE 6650EZ: Brand: IOBAN™ 2

## (undated) DEVICE — SUTURE MCRYL SZ 3-0 L27IN ABSRB UD L24MM PS-1 3/8 CIR PRIM Y936H

## (undated) DEVICE — SUTURE VCRL SZ 2-0 L27IN ABSRB UD L36MM CP-1 1/2 CIR REV J266H

## (undated) DEVICE — BLADE CLIPPER GEN PURP NS

## (undated) DEVICE — SOLUTION IV 500ML 0.9% SOD CHL PH 5 INJ USP VIAFLX PLAS

## (undated) DEVICE — SKIN AFFIX SURG ADHESIVE 72/CS 0.55ML: Brand: MEDLINE

## (undated) DEVICE — SUTURE PERMA-HAND SZ 3-0 L30IN NONABSORBABLE BLK L60MM KS 622H

## (undated) DEVICE — GOWN,SIRUS,NON REINFRCD,LARGE,SET IN SL: Brand: MEDLINE

## (undated) DEVICE — MEDI-VAC YANKAUER SUCTION HANDLE W/BULBOUS TIP: Brand: CARDINAL HEALTH

## (undated) DEVICE — GLOVE ORANGE PI 7 1/2   MSG9075

## (undated) DEVICE — IMPREGNATED GAUZE DRESSING: Brand: CUTICERIN 7.5X20CM CTN 50

## (undated) DEVICE — 3M™ TEGADERM™ TRANSPARENT FILM DRESSING FRAME STYLE, 1624W, 2-3/8 IN X 2-3/4 IN (6 CM X 7 CM), 100/CT 4CT/CASE: Brand: 3M™ TEGADERM™

## (undated) DEVICE — HYPODERMIC SAFETY NEEDLE: Brand: MAGELLAN

## (undated) DEVICE — BUR RND FLUT AGRSV 5MM

## (undated) DEVICE — TRAY CATH 16FR F INCLUDE LUB DRNGE BG STATLOK STBL DEV

## (undated) DEVICE — TUBING PRSS 36 M F

## (undated) DEVICE — SPLINT ARMBRD W3XL10.5IN POLYFOAM DLX A LN

## (undated) DEVICE — SYRINGE BULB 50/CS 48/PLT: Brand: MEDEGEN MEDICAL PRODUCTS, LLC

## (undated) DEVICE — BASIC SINGLE BASIN BTC-LF: Brand: MEDLINE INDUSTRIES, INC.

## (undated) DEVICE — SET AUTOTRNS C175ML BOWL BTM OUTLT RESERVOIRXTRA

## (undated) DEVICE — SUTURE ETHLN SZ 3-0 L18IN NONABSORBABLE BLK FS-1 L24MM 3/8 663H

## (undated) DEVICE — 3M™ TEGADERM™ TRANSPARENT FILM DRESSING FRAME STYLE, 1626W, 4 IN X 4-3/4 IN (10 CM X 12 CM), 50/CT 4CT/CASE: Brand: 3M™ TEGADERM™

## (undated) DEVICE — PAD OP RM W20XL72XH2IN PRECIS CUT FLAT EC BIOCLINIC

## (undated) DEVICE — SET CATH 20GA L1.5IN RAD ART POLYUR RADPQ W/ INTEGR 0.018IN

## (undated) DEVICE — Z DISCONTINUED APPLICATOR SURG PREP 0.5OZ 2% CHG 70% ISO ALC WET FOR UNIV

## (undated) DEVICE — SUREFIT, DUAL DISPERSIVE ELECTRODE, CONTACT QUALITY MONITOR: Brand: SUREFIT

## (undated) DEVICE — SPONGE GZ W4XL4IN COT 12 PLY TYP VII WVN C FLD DSGN

## (undated) DEVICE — YANKAUER,BULB TIP,W/O VENT,RIGID,STERILE: Brand: MEDLINE

## (undated) DEVICE — Z INACTIVE USE 2660664 SOLUTION IRRIG 3000ML 0.9% SOD CHL USP UROMATIC PLAS CONT

## (undated) DEVICE — 3M™ WARMING BLANKET, UPPER BODY, 10 PER CASE, 42268: Brand: BAIR HUGGER™

## (undated) DEVICE — 2000CC GUARDIAN II: Brand: GUARDIAN

## (undated) DEVICE — Device

## (undated) DEVICE — FAN SPRAY KIT: Brand: PULSAVAC®

## (undated) DEVICE — BIPOLAR SEALER 23-112-1 AQM 6.0: Brand: AQUAMANTYS ®

## (undated) DEVICE — SPONGE LAP W18XL18IN WHT COT 4 PLY FLD STRUNG RADPQ DISP ST